# Patient Record
Sex: MALE | Race: WHITE | NOT HISPANIC OR LATINO | URBAN - METROPOLITAN AREA
[De-identification: names, ages, dates, MRNs, and addresses within clinical notes are randomized per-mention and may not be internally consistent; named-entity substitution may affect disease eponyms.]

---

## 2017-03-19 ENCOUNTER — OUTPATIENT (OUTPATIENT)
Dept: OUTPATIENT SERVICES | Facility: HOSPITAL | Age: 52
LOS: 1 days | End: 2017-03-19

## 2017-03-19 ENCOUNTER — INPATIENT (INPATIENT)
Facility: HOSPITAL | Age: 52
LOS: 1 days | Discharge: HOME | End: 2017-03-21
Payer: COMMERCIAL

## 2017-03-19 PROCEDURE — 71020: CPT | Mod: 26

## 2017-03-19 PROCEDURE — 99284 EMERGENCY DEPT VISIT MOD MDM: CPT

## 2017-03-19 PROCEDURE — 71275 CT ANGIOGRAPHY CHEST: CPT | Mod: 26

## 2017-03-20 ENCOUNTER — OUTPATIENT (OUTPATIENT)
Dept: OUTPATIENT SERVICES | Facility: HOSPITAL | Age: 52
LOS: 1 days | End: 2017-03-20

## 2017-03-20 PROCEDURE — 93970 EXTREMITY STUDY: CPT | Mod: 26

## 2017-03-21 ENCOUNTER — OUTPATIENT (OUTPATIENT)
Dept: OUTPATIENT SERVICES | Facility: HOSPITAL | Age: 52
LOS: 1 days | End: 2017-03-21

## 2018-12-23 ENCOUNTER — EMERGENCY (EMERGENCY)
Facility: HOSPITAL | Age: 53
LOS: 1 days | End: 2018-12-23
Payer: COMMERCIAL

## 2018-12-23 PROCEDURE — 71046 X-RAY EXAM CHEST 2 VIEWS: CPT | Mod: 26

## 2018-12-23 PROCEDURE — 99285 EMERGENCY DEPT VISIT HI MDM: CPT

## 2019-02-24 ENCOUNTER — OUTPATIENT (OUTPATIENT)
Dept: OUTPATIENT SERVICES | Facility: HOSPITAL | Age: 54
LOS: 1 days | End: 2019-02-24
Payer: COMMERCIAL

## 2019-02-24 PROCEDURE — 99285 EMERGENCY DEPT VISIT HI MDM: CPT

## 2019-02-24 PROCEDURE — 71045 X-RAY EXAM CHEST 1 VIEW: CPT | Mod: 26

## 2019-02-25 ENCOUNTER — OUTPATIENT (OUTPATIENT)
Dept: OUTPATIENT SERVICES | Facility: HOSPITAL | Age: 54
LOS: 1 days | End: 2019-02-25
Payer: COMMERCIAL

## 2019-02-25 ENCOUNTER — OUTPATIENT (OUTPATIENT)
Dept: OUTPATIENT SERVICES | Facility: HOSPITAL | Age: 54
LOS: 1 days | End: 2019-02-25

## 2019-02-25 PROCEDURE — 93010 ELECTROCARDIOGRAM REPORT: CPT | Mod: 59

## 2019-02-25 PROCEDURE — 93458 L HRT ARTERY/VENTRICLE ANGIO: CPT | Mod: 26

## 2019-02-28 ENCOUNTER — APPOINTMENT (OUTPATIENT)
Dept: CARDIOLOGY | Facility: CLINIC | Age: 54
End: 2019-02-28
Payer: COMMERCIAL

## 2019-02-28 VITALS
OXYGEN SATURATION: 98 % | SYSTOLIC BLOOD PRESSURE: 110 MMHG | HEART RATE: 76 BPM | WEIGHT: 208 LBS | DIASTOLIC BLOOD PRESSURE: 76 MMHG | HEIGHT: 70 IN | BODY MASS INDEX: 29.78 KG/M2

## 2019-02-28 DIAGNOSIS — Z82.49 FAMILY HISTORY OF ISCHEMIC HEART DISEASE AND OTHER DISEASES OF THE CIRCULATORY SYSTEM: ICD-10-CM

## 2019-02-28 DIAGNOSIS — Z78.9 OTHER SPECIFIED HEALTH STATUS: ICD-10-CM

## 2019-02-28 PROCEDURE — 99215 OFFICE O/P EST HI 40 MIN: CPT

## 2019-02-28 RX ORDER — METOPROLOL SUCCINATE 25 MG/1
25 TABLET, EXTENDED RELEASE ORAL
Refills: 0 | Status: DISCONTINUED | COMMUNITY
End: 2019-02-28

## 2019-02-28 NOTE — DISCUSSION/SUMMARY
[FreeTextEntry1] : 1) I discontinued the Beta blocker because of low BP\par 2) He will remain on the BP and Lipid meds\par 3) Return visit in 6 months

## 2019-02-28 NOTE — PHYSICAL EXAM
[General Appearance - Well Developed] : well developed [Normal Appearance] : normal appearance [Well Groomed] : well groomed [General Appearance - Well Nourished] : well nourished [No Deformities] : no deformities [General Appearance - In No Acute Distress] : no acute distress [Normal Conjunctiva] : the conjunctiva exhibited no abnormalities [Eyelids - No Xanthelasma] : the eyelids demonstrated no xanthelasmas [Normal Oral Mucosa] : normal oral mucosa [No Oral Pallor] : no oral pallor [No Oral Cyanosis] : no oral cyanosis [Normal Jugular Venous A Waves Present] : normal jugular venous A waves present [Normal Jugular Venous V Waves Present] : normal jugular venous V waves present [No Jugular Venous Bentley A Waves] : no jugular venous bentley A waves [Respiration, Rhythm And Depth] : normal respiratory rhythm and effort [Exaggerated Use Of Accessory Muscles For Inspiration] : no accessory muscle use [Auscultation Breath Sounds / Voice Sounds] : lungs were clear to auscultation bilaterally [Heart Rate And Rhythm] : heart rate and rhythm were normal [Heart Sounds] : normal S1 and S2 [Murmurs] : no murmurs present [Abdomen Soft] : soft [Abdomen Tenderness] : non-tender [Abdomen Mass (___ Cm)] : no abdominal mass palpated [Abnormal Walk] : normal gait [Gait - Sufficient For Exercise Testing] : the gait was sufficient for exercise testing [Nail Clubbing] : no clubbing of the fingernails [Cyanosis, Localized] : no localized cyanosis [Petechial Hemorrhages (___cm)] : no petechial hemorrhages [Skin Color & Pigmentation] : normal skin color and pigmentation [] : no rash [No Venous Stasis] : no venous stasis [Skin Lesions] : no skin lesions [No Skin Ulcers] : no skin ulcer [No Xanthoma] : no  xanthoma was observed [Oriented To Time, Place, And Person] : oriented to person, place, and time [Affect] : the affect was normal [Mood] : the mood was normal [No Anxiety] : not feeling anxious

## 2019-02-28 NOTE — REASON FOR VISIT
[Follow-Up - From Hospitalization] : follow-up of a recent hospitalization for [Chest Pain] : chest pain [Hyperlipidemia] : hyperlipidemia [Hypertension] : hypertension [FreeTextEntry1] : I saw this 53-year-old man in followup consultation on  02/28/19\par He has a history of treatment of a cerebral AV fistula, possible pulmonary embolus 2 years ago, hypertension hyperlipidemia and a family history for coronary artery disease.\par He was admitted to the hospital with vague chest pain, underwent cardiac cath, which revealed completely normal coronary arteries.\par He is asymptomatic

## 2019-09-03 ENCOUNTER — APPOINTMENT (OUTPATIENT)
Dept: CARDIOLOGY | Facility: CLINIC | Age: 54
End: 2019-09-03

## 2020-11-03 ENCOUNTER — TRANSCRIPTION ENCOUNTER (OUTPATIENT)
Age: 55
End: 2020-11-03

## 2020-11-03 ENCOUNTER — APPOINTMENT (OUTPATIENT)
Dept: ULTRASOUND IMAGING | Facility: CLINIC | Age: 55
End: 2020-11-03
Payer: COMMERCIAL

## 2020-11-03 PROCEDURE — 76700 US EXAM ABDOM COMPLETE: CPT

## 2020-11-03 PROCEDURE — 99072 ADDL SUPL MATRL&STAF TM PHE: CPT

## 2021-01-07 ENCOUNTER — NON-APPOINTMENT (OUTPATIENT)
Age: 56
End: 2021-01-07

## 2021-01-08 ENCOUNTER — NON-APPOINTMENT (OUTPATIENT)
Age: 56
End: 2021-01-08

## 2021-01-08 ENCOUNTER — APPOINTMENT (OUTPATIENT)
Dept: CARDIOLOGY | Facility: CLINIC | Age: 56
End: 2021-01-08
Payer: COMMERCIAL

## 2021-01-08 VITALS
BODY MASS INDEX: 29.49 KG/M2 | TEMPERATURE: 96.8 F | OXYGEN SATURATION: 98 % | DIASTOLIC BLOOD PRESSURE: 68 MMHG | SYSTOLIC BLOOD PRESSURE: 112 MMHG | WEIGHT: 206 LBS | HEIGHT: 70 IN | HEART RATE: 102 BPM

## 2021-01-08 PROCEDURE — 93000 ELECTROCARDIOGRAM COMPLETE: CPT

## 2021-01-08 PROCEDURE — 99214 OFFICE O/P EST MOD 30 MIN: CPT

## 2021-01-08 PROCEDURE — 99072 ADDL SUPL MATRL&STAF TM PHE: CPT

## 2021-01-08 NOTE — PHYSICAL EXAM
[General Appearance - Well Developed] : well developed [Normal Appearance] : normal appearance [Well Groomed] : well groomed [General Appearance - Well Nourished] : well nourished [No Deformities] : no deformities [General Appearance - In No Acute Distress] : no acute distress [Normal Conjunctiva] : the conjunctiva exhibited no abnormalities [Eyelids - No Xanthelasma] : the eyelids demonstrated no xanthelasmas [] : no respiratory distress [Respiration, Rhythm And Depth] : normal respiratory rhythm and effort [Exaggerated Use Of Accessory Muscles For Inspiration] : no accessory muscle use [Auscultation Breath Sounds / Voice Sounds] : lungs were clear to auscultation bilaterally [Heart Rate And Rhythm] : heart rate and rhythm were normal [Heart Sounds] : normal S1 and S2 [Murmurs] : no murmurs present [Abnormal Walk] : normal gait [Gait - Sufficient For Exercise Testing] : the gait was sufficient for exercise testing [Oriented To Time, Place, And Person] : oriented to person, place, and time [Affect] : the affect was normal [Mood] : the mood was normal [FreeTextEntry1] : Anxious

## 2021-01-08 NOTE — ASSESSMENT
[FreeTextEntry1] : To review, Demetrius is a very pleasant 54-year-old male with the following active issues.\par Chest pain: Abnormal EKG.  No prior EKG to compare.  ETT would be nondiagnostic.  Stress echocardiogram for further evaluation.  Low likelihood that pain is obstructive in nature with normal cardiac catheterization in 2019.  Recommend start OTC PPI and follow-up with PMD or GI.  Echocardiogram to rule out any pericardial disease.\par \par Left-sided paresthesias.  Remote.  Resolved.  No further episodes.  Carotid ultrasound to rule out ulcerated or mobile plaque.\par \par Hypertension: Well-controlled on current meds.  Continue same.\par \par Hyperlipidemia: Tolerating statin therapy.  No labs to review.  They will be requested from PMD.\par \par Red flag symptoms that would warrant emergent evaluation discussed.  Pt verbalizes understanding. \par \par F/U to review testing.

## 2021-01-08 NOTE — REVIEW OF SYSTEMS
[Chest Pain] : chest pain [see HPI] : see HPI [Numbness (Hypesthesia)] : numbness [Tingling (Paresthesia)] : tingling [Anxiety] : anxiety [Negative] : Heme/Lymph

## 2021-01-11 ENCOUNTER — APPOINTMENT (OUTPATIENT)
Dept: CARDIOLOGY | Facility: CLINIC | Age: 56
End: 2021-01-11
Payer: COMMERCIAL

## 2021-01-11 PROCEDURE — 93880 EXTRACRANIAL BILAT STUDY: CPT

## 2021-01-11 PROCEDURE — 99072 ADDL SUPL MATRL&STAF TM PHE: CPT

## 2021-01-11 PROCEDURE — 93306 TTE W/DOPPLER COMPLETE: CPT

## 2021-01-13 ENCOUNTER — NON-APPOINTMENT (OUTPATIENT)
Age: 56
End: 2021-01-13

## 2021-02-10 ENCOUNTER — TRANSCRIPTION ENCOUNTER (OUTPATIENT)
Age: 56
End: 2021-02-10

## 2021-02-25 ENCOUNTER — APPOINTMENT (OUTPATIENT)
Dept: CARDIOLOGY | Facility: CLINIC | Age: 56
End: 2021-02-25

## 2021-02-25 ENCOUNTER — RESULT REVIEW (OUTPATIENT)
Age: 56
End: 2021-02-25

## 2021-02-26 ENCOUNTER — APPOINTMENT (OUTPATIENT)
Dept: UROLOGY | Facility: CLINIC | Age: 56
End: 2021-02-26
Payer: COMMERCIAL

## 2021-02-26 ENCOUNTER — TRANSCRIPTION ENCOUNTER (OUTPATIENT)
Age: 56
End: 2021-02-26

## 2021-02-26 VITALS
BODY MASS INDEX: 29.92 KG/M2 | TEMPERATURE: 97.3 F | SYSTOLIC BLOOD PRESSURE: 138 MMHG | HEART RATE: 93 BPM | HEIGHT: 70 IN | DIASTOLIC BLOOD PRESSURE: 96 MMHG | WEIGHT: 209 LBS

## 2021-02-26 PROCEDURE — 99072 ADDL SUPL MATRL&STAF TM PHE: CPT

## 2021-02-26 PROCEDURE — 99205 OFFICE O/P NEW HI 60 MIN: CPT

## 2021-02-26 NOTE — ASSESSMENT
[FreeTextEntry1] : Patient wit 2 episodes of painless hematuria.He is not smoker\par He had a history of nephrolithiasis but today he has no symptoms of renal colic\par His GILLIAN showed normal prostate without any tender lesions.\par I explained to the him our plan: CTU and discuss the Cystoscopy as the next step

## 2021-02-26 NOTE — PHYSICAL EXAM
[General Appearance - Well Developed] : well developed [General Appearance - Well Nourished] : well nourished [Normal Appearance] : normal appearance [Heart Rate And Rhythm] : Heart rate and rhythm were normal [Arterial Pulses Normal] : the pedal pulses were normal [] : no respiratory distress [Lungs Percussion] : the lungs were normal to percussion [Abdomen Soft] : soft [Abdomen Tenderness] : non-tender [Urethral Meatus] : meatus normal [Penis Abnormality] : normal uncircumcised penis [Scrotum] : the scrotum was normal [Prostate Enlargement] : the prostate was not enlarged [Prostate Tenderness] : the prostate was not tender

## 2021-02-26 NOTE — HISTORY OF PRESENT ILLNESS
[FreeTextEntry1] : 55- Patient presented today with the 2 episodes of painless hematuria.\par He has no urgency, frequency or burning/pain\par He is not smoking \par His UA showed Red blood cells without signs of infection\par  [Urinary Incontinence] : no urinary incontinence [Urinary Retention] : no urinary retention [Urinary Urgency] : no urinary urgency [Nocturia] : no nocturia [Straining] : no straining [Hematuria - Gross] : gross hematuria

## 2021-03-01 ENCOUNTER — RESULT REVIEW (OUTPATIENT)
Age: 56
End: 2021-03-01

## 2021-03-01 ENCOUNTER — APPOINTMENT (OUTPATIENT)
Dept: CT IMAGING | Facility: CLINIC | Age: 56
End: 2021-03-01
Payer: COMMERCIAL

## 2021-03-01 LAB
BILIRUB UR QL STRIP: NEGATIVE
CLARITY UR: CLEAR
COLLECTION METHOD: NORMAL
GLUCOSE UR-MCNC: NEGATIVE
HCG UR QL: 0.2 EU/DL
HGB UR QL STRIP.AUTO: NORMAL
KETONES UR-MCNC: NEGATIVE
LEUKOCYTE ESTERASE UR QL STRIP: NEGATIVE
NITRITE UR QL STRIP: NEGATIVE
PH UR STRIP: 6
PROT UR STRIP-MCNC: NORMAL
SP GR UR STRIP: 1.02

## 2021-03-01 PROCEDURE — 74178 CT ABD&PLV WO CNTR FLWD CNTR: CPT

## 2021-03-01 PROCEDURE — Q9967B: CUSTOM

## 2021-03-01 PROCEDURE — 82565A: CUSTOM | Mod: QW

## 2021-03-02 ENCOUNTER — APPOINTMENT (OUTPATIENT)
Dept: CARDIOLOGY | Facility: CLINIC | Age: 56
End: 2021-03-02

## 2021-03-03 ENCOUNTER — TRANSCRIPTION ENCOUNTER (OUTPATIENT)
Age: 56
End: 2021-03-03

## 2021-03-08 ENCOUNTER — APPOINTMENT (OUTPATIENT)
Dept: ULTRASOUND IMAGING | Facility: CLINIC | Age: 56
End: 2021-03-08
Payer: COMMERCIAL

## 2021-03-08 PROCEDURE — 76775 US EXAM ABDO BACK WALL LIM: CPT

## 2021-03-09 ENCOUNTER — NON-APPOINTMENT (OUTPATIENT)
Age: 56
End: 2021-03-09

## 2021-03-11 ENCOUNTER — TRANSCRIPTION ENCOUNTER (OUTPATIENT)
Age: 56
End: 2021-03-11

## 2021-03-12 ENCOUNTER — NON-APPOINTMENT (OUTPATIENT)
Age: 56
End: 2021-03-12

## 2021-03-12 ENCOUNTER — APPOINTMENT (OUTPATIENT)
Dept: UROLOGY | Facility: CLINIC | Age: 56
End: 2021-03-12
Payer: COMMERCIAL

## 2021-03-12 VITALS
SYSTOLIC BLOOD PRESSURE: 128 MMHG | DIASTOLIC BLOOD PRESSURE: 76 MMHG | HEART RATE: 88 BPM | BODY MASS INDEX: 29.92 KG/M2 | WEIGHT: 209 LBS | HEIGHT: 70 IN | TEMPERATURE: 97.3 F

## 2021-03-12 DIAGNOSIS — R31.0 GROSS HEMATURIA: ICD-10-CM

## 2021-03-12 PROCEDURE — 99072 ADDL SUPL MATRL&STAF TM PHE: CPT

## 2021-03-12 PROCEDURE — 99214 OFFICE O/P EST MOD 30 MIN: CPT

## 2021-03-12 NOTE — ASSESSMENT
[FreeTextEntry1] : Patient with his wife to discuss the results of clinical findings and diagnostics\par His CT - U showed a 7 mm stone in the left lower ureter without hydronephrosis and any other suspicions lesions.\par His US denies hydronephrosis but cannot exclude the stone\par Patient has no evidence that he passed the stone\par He had an additional episode of painless gross hematuria.\par We decided not to perform additional CT to decrease the cumulative effect of radiation. We have to do cystoscopy in any way as patient continues with painless hematuria. During this procedure we can do retrograde left ureterography and if the stone is still in the same place we continue with the ureteroscopy and lithotripsy.\par If we see the bloody efflux of urine from the ureter we'll have an additional indication for ureteroscopy \par I inform patient and his wife about the steps of procedure, it's complications and adverse effects

## 2021-03-12 NOTE — PHYSICAL EXAM
[General Appearance - Well Developed] : well developed [General Appearance - Well Nourished] : well nourished [Normal Appearance] : normal appearance [Abdomen Soft] : soft [Abdomen Tenderness] : non-tender [Urethral Meatus] : meatus normal [Penis Abnormality] : normal uncircumcised penis [Scrotum] : the scrotum was normal [Prostate Enlargement] : the prostate was not enlarged [Prostate Tenderness] : the prostate was not tender [Heart Rate And Rhythm] : Heart rate and rhythm were normal [Arterial Pulses Normal] : the pedal pulses were normal [] : no respiratory distress [Lungs Percussion] : the lungs were normal to percussion

## 2021-03-12 NOTE — HISTORY OF PRESENT ILLNESS
[Hematuria - Gross] : gross hematuria [FreeTextEntry1] : 55 year -old patient presented today for the follow-up. I remember him from  our previous visits when he came for the  2 episodes of painless hematuria.\par He had no urgency, frequency or burning/pain\par He is not smoking \par His UA showed Red blood cells without signs of infection\par We did CT-U and found 7 mm stone in the left low ureter without hydronephrosis and any other susp lesions\par We discussed this finding on the phone call\par Today patient presented with his wife to discuss the further steps of treatment and diagnostic procedures\par He had one more episode of painless hematuria\par He already had a follow-up US that could not denie the presence of stone but definitely denies Hydronephrosis  \par  [Urinary Incontinence] : no urinary incontinence [Urinary Retention] : no urinary retention [Urinary Urgency] : no urinary urgency [Nocturia] : no nocturia [Straining] : no straining

## 2021-03-12 NOTE — LETTER BODY
[Consult Letter:] : I had the pleasure of evaluating your patient, [unfilled]. [Consult Closing:] : Thank you very much for allowing me to participate in the care of this patient.  If you have any questions, please do not hesitate to contact me. [FreeTextEntry1] : Patient with his wife to discuss the results of clinical findings and diagnostics His CT - U showed a 7 mm stone in the left lower ureter without hydronephrosis and any other suspicions lesions. His US denies hydronephrosis but cannot exclude the stone Patient has no evidence that he passed the stone He had an additional episode of painless gross hematuria. We decided not to perform additional CT to decrease the cumulative effect of radiation. We have to do cystoscopy in any way as patient continues with painless hematuria. During this procedure we can do retrograde left ureterography and if the stone is still in the same place we continue with the ureteroscopy and lithotripsy. If we see the bloody efflux of urine from the ureter we'll have an additional indication for ureteroscopy  I inform patient and his wife about the steps of procedure, it's complications and adverse effects

## 2021-03-15 ENCOUNTER — APPOINTMENT (OUTPATIENT)
Dept: UROLOGY | Facility: CLINIC | Age: 56
End: 2021-03-15

## 2021-03-18 ENCOUNTER — TRANSCRIPTION ENCOUNTER (OUTPATIENT)
Age: 56
End: 2021-03-18

## 2021-03-19 ENCOUNTER — OUTPATIENT (OUTPATIENT)
Dept: OUTPATIENT SERVICES | Facility: HOSPITAL | Age: 56
LOS: 1 days | End: 2021-03-19

## 2021-03-25 ENCOUNTER — TRANSCRIPTION ENCOUNTER (OUTPATIENT)
Age: 56
End: 2021-03-25

## 2021-03-30 ENCOUNTER — APPOINTMENT (OUTPATIENT)
Dept: DISASTER EMERGENCY | Facility: CLINIC | Age: 56
End: 2021-03-30

## 2021-03-30 DIAGNOSIS — Z01.818 ENCOUNTER FOR OTHER PREPROCEDURAL EXAMINATION: ICD-10-CM

## 2021-03-31 LAB — SARS-COV-2 N GENE NPH QL NAA+PROBE: NOT DETECTED

## 2021-04-02 ENCOUNTER — OUTPATIENT (OUTPATIENT)
Dept: OUTPATIENT SERVICES | Facility: HOSPITAL | Age: 56
LOS: 1 days | End: 2021-04-02
Payer: COMMERCIAL

## 2021-04-02 ENCOUNTER — APPOINTMENT (OUTPATIENT)
Dept: UROLOGY | Facility: HOSPITAL | Age: 56
End: 2021-04-02

## 2021-04-02 PROCEDURE — 52356 CYSTO/URETERO W/LITHOTRIPSY: CPT | Mod: LT

## 2021-04-05 ENCOUNTER — APPOINTMENT (OUTPATIENT)
Dept: UROLOGY | Facility: CLINIC | Age: 56
End: 2021-04-05
Payer: COMMERCIAL

## 2021-04-05 VITALS
DIASTOLIC BLOOD PRESSURE: 74 MMHG | HEIGHT: 70 IN | TEMPERATURE: 97.3 F | HEART RATE: 90 BPM | SYSTOLIC BLOOD PRESSURE: 130 MMHG | BODY MASS INDEX: 29.92 KG/M2 | WEIGHT: 209 LBS

## 2021-04-05 PROCEDURE — 99024 POSTOP FOLLOW-UP VISIT: CPT

## 2021-04-05 NOTE — ASSESSMENT
[FreeTextEntry1] : Today of the first day after the left ureteroscopy with stent insertion and lithotripsy.  Patient had the hematuria after the procedure but now the hematuria stopped.  He denies she has any fever.  He has a stent induced pain that can be described as moderate.  We will plan to take out the stent on the next Monday.  Patient asked me to prescribe the Valium before the procedure and I will do this.

## 2021-04-05 NOTE — HISTORY OF PRESENT ILLNESS
[FreeTextEntry1] : 55-year-old patient presented today for the follow-up.  I operated on him 3 days ago and it was left ureteroscopy.  During this procedure we treated the stone in the left ureter.  We used the laser lithotripsy and the planes and ureter from the fragments.  We also pulled the stent.  Because there was a clear evidence of the impacted stone with the changes of the urothelium around the stone we decided to continue with the stent for 10 days.  The patient presented and I explained to him and his wife our plan.  Patient also has a hematuria after the procedure and I explained that it can be considered is a usual adverse effects after the procedure.  Today his pneumaturia stopped.  He denies chills and fever.

## 2021-04-05 NOTE — PHYSICAL EXAM
[General Appearance - Well Developed] : well developed [General Appearance - Well Nourished] : well nourished [Normal Appearance] : normal appearance [Abdomen Soft] : soft [Abdomen Tenderness] : non-tender [Urethral Meatus] : meatus normal [Penis Abnormality] : normal uncircumcised penis [Scrotum] : the scrotum was normal [Prostate Enlargement] : the prostate was not enlarged [Heart Rate And Rhythm] : Heart rate and rhythm were normal [Prostate Tenderness] : the prostate was not tender [Arterial Pulses Normal] : the pedal pulses were normal [] : no respiratory distress [Lungs Percussion] : the lungs were normal to percussion

## 2021-04-12 ENCOUNTER — APPOINTMENT (OUTPATIENT)
Dept: UROLOGY | Facility: CLINIC | Age: 56
End: 2021-04-12
Payer: COMMERCIAL

## 2021-04-12 VITALS
DIASTOLIC BLOOD PRESSURE: 72 MMHG | BODY MASS INDEX: 29.92 KG/M2 | HEIGHT: 70 IN | HEART RATE: 98 BPM | TEMPERATURE: 97.4 F | SYSTOLIC BLOOD PRESSURE: 109 MMHG | WEIGHT: 209 LBS

## 2021-04-12 PROCEDURE — 99072 ADDL SUPL MATRL&STAF TM PHE: CPT

## 2021-04-12 PROCEDURE — 52005 CYSTO W/URTRL CATHJ: CPT

## 2021-05-13 ENCOUNTER — NON-APPOINTMENT (OUTPATIENT)
Age: 56
End: 2021-05-13

## 2021-05-17 ENCOUNTER — APPOINTMENT (OUTPATIENT)
Dept: UROLOGY | Facility: CLINIC | Age: 56
End: 2021-05-17
Payer: COMMERCIAL

## 2021-05-17 VITALS
SYSTOLIC BLOOD PRESSURE: 122 MMHG | DIASTOLIC BLOOD PRESSURE: 72 MMHG | BODY MASS INDEX: 29.92 KG/M2 | WEIGHT: 209 LBS | HEIGHT: 70 IN | TEMPERATURE: 97.3 F | HEART RATE: 73 BPM

## 2021-05-17 DIAGNOSIS — Z87.448 PERSONAL HISTORY OF OTHER DISEASES OF URINARY SYSTEM: ICD-10-CM

## 2021-05-17 PROCEDURE — 99072 ADDL SUPL MATRL&STAF TM PHE: CPT

## 2021-05-17 PROCEDURE — 99212 OFFICE O/P EST SF 10 MIN: CPT

## 2021-05-17 NOTE — HISTORY OF PRESENT ILLNESS
[FreeTextEntry1] : 55-year-old patient presented today for the follow-up.  I operated on him 1.5 month ago and it was left ureteroscopy.  During this procedure we treated the stone in the left ureter.  We used the laser lithotripsy and the planes and ureter from the fragments.  We also pulled out the stent.  Because there was a clear evidence of the impacted stone with the changes of the urothelium around the stone we decided to continue with the stent for 10 days.  \par Today he feels fine and has no pain and denied gross hematuria\par Last visit I asked him to check urine for salts and citrate and today he presented to discuss the results

## 2021-05-17 NOTE — ASSESSMENT
[FreeTextEntry1] : Patient is after Left Ureteroscopy and stent removal.\par Today we discussed the results of 24 hours collection of urine\par We didn't find any pathology and I recommended him to increase the water consumption. I also asked him to check renal US and we'll consider to return to [facility] in future

## 2021-06-21 ENCOUNTER — EMERGENCY (EMERGENCY)
Facility: HOSPITAL | Age: 56
LOS: 1 days | End: 2021-06-21
Admitting: STUDENT IN AN ORGANIZED HEALTH CARE EDUCATION/TRAINING PROGRAM
Payer: COMMERCIAL

## 2021-06-21 ENCOUNTER — OUTPATIENT (OUTPATIENT)
Dept: OUTPATIENT SERVICES | Facility: HOSPITAL | Age: 56
LOS: 1 days | End: 2021-06-21

## 2021-06-21 ENCOUNTER — NON-APPOINTMENT (OUTPATIENT)
Age: 56
End: 2021-06-21

## 2021-06-21 PROCEDURE — 71045 X-RAY EXAM CHEST 1 VIEW: CPT | Mod: 26

## 2021-06-21 PROCEDURE — 99285 EMERGENCY DEPT VISIT HI MDM: CPT

## 2021-06-21 PROCEDURE — 93010 ELECTROCARDIOGRAM REPORT: CPT

## 2021-06-22 ENCOUNTER — OUTPATIENT (OUTPATIENT)
Dept: OUTPATIENT SERVICES | Facility: HOSPITAL | Age: 56
LOS: 1 days | End: 2021-06-22

## 2021-06-22 PROCEDURE — 93010 ELECTROCARDIOGRAM REPORT: CPT

## 2021-06-22 PROCEDURE — 99244 OFF/OP CNSLTJ NEW/EST MOD 40: CPT

## 2021-06-23 ENCOUNTER — APPOINTMENT (OUTPATIENT)
Dept: CARDIOLOGY | Facility: CLINIC | Age: 56
End: 2021-06-23
Payer: COMMERCIAL

## 2021-06-23 PROCEDURE — 93015 CV STRESS TEST SUPVJ I&R: CPT

## 2021-06-23 PROCEDURE — 99072 ADDL SUPL MATRL&STAF TM PHE: CPT

## 2021-06-28 ENCOUNTER — NON-APPOINTMENT (OUTPATIENT)
Age: 56
End: 2021-06-28

## 2021-06-28 RX ORDER — IRBESARTAN AND HYDROCHLOROTHIAZIDE 300; 12.5 MG/1; MG/1
300-12.5 TABLET ORAL
Refills: 0 | Status: DISCONTINUED | COMMUNITY
End: 2021-06-28

## 2021-06-29 ENCOUNTER — APPOINTMENT (OUTPATIENT)
Dept: CARDIOLOGY | Facility: CLINIC | Age: 56
End: 2021-06-29
Payer: COMMERCIAL

## 2021-06-29 VITALS
HEART RATE: 82 BPM | DIASTOLIC BLOOD PRESSURE: 70 MMHG | OXYGEN SATURATION: 97 % | BODY MASS INDEX: 29.35 KG/M2 | SYSTOLIC BLOOD PRESSURE: 114 MMHG | HEIGHT: 70 IN | TEMPERATURE: 96.9 F | WEIGHT: 205 LBS

## 2021-06-29 DIAGNOSIS — R20.2 ANESTHESIA OF SKIN: ICD-10-CM

## 2021-06-29 DIAGNOSIS — R20.0 ANESTHESIA OF SKIN: ICD-10-CM

## 2021-06-29 DIAGNOSIS — R25.2 CRAMP AND SPASM: ICD-10-CM

## 2021-06-29 PROCEDURE — 99072 ADDL SUPL MATRL&STAF TM PHE: CPT

## 2021-06-29 PROCEDURE — 99214 OFFICE O/P EST MOD 30 MIN: CPT

## 2021-06-29 RX ORDER — CIPROFLOXACIN HYDROCHLORIDE 500 MG/1
500 TABLET, FILM COATED ORAL TWICE DAILY
Qty: 10 | Refills: 0 | Status: DISCONTINUED | COMMUNITY
Start: 2021-04-02 | End: 2021-06-29

## 2021-06-29 RX ORDER — TAMSULOSIN HYDROCHLORIDE 0.4 MG/1
0.4 CAPSULE ORAL
Qty: 30 | Refills: 0 | Status: DISCONTINUED | COMMUNITY
Start: 2021-03-01 | End: 2021-06-29

## 2021-06-29 RX ORDER — ACETAMINOPHEN 500 MG/1
500 TABLET, COATED ORAL
Qty: 30 | Refills: 0 | Status: DISCONTINUED | COMMUNITY
Start: 2021-04-02 | End: 2021-06-29

## 2021-06-29 RX ORDER — PHENAZOPYRIDINE HYDROCHLORIDE 100 MG/1
100 TABLET ORAL 3 TIMES DAILY
Qty: 15 | Refills: 0 | Status: DISCONTINUED | COMMUNITY
Start: 2021-04-02 | End: 2021-06-29

## 2021-06-29 RX ORDER — KETOROLAC TROMETHAMINE 10 MG/1
10 TABLET, FILM COATED ORAL 3 TIMES DAILY
Qty: 15 | Refills: 0 | Status: DISCONTINUED | COMMUNITY
Start: 2021-04-02 | End: 2021-06-29

## 2021-06-29 RX ORDER — DIAZEPAM 10 MG/1
10 TABLET ORAL
Qty: 2 | Refills: 0 | Status: DISCONTINUED | COMMUNITY
Start: 2021-04-05 | End: 2021-06-29

## 2021-07-04 PROBLEM — R25.2 LEG CRAMPING: Status: ACTIVE | Noted: 2021-06-29

## 2021-07-04 PROBLEM — R20.0 NUMBNESS AND TINGLING: Status: ACTIVE | Noted: 2021-01-08

## 2021-07-04 NOTE — ASSESSMENT
[FreeTextEntry1] : To review, Demetrius is a very pleasant 54-year-old male with the following active issues.\par \par Chest pain: Abnormal EKG on recent ETT.  Stress echocardiogram for further evaluation.  Low likelihood that pain is obstructive in nature with normal cardiac catheterization in 2019.  \par \par Left-sided paresthesias.  Remote.  Resolved.  No further episodes.  Normal carotid US. \par \par Hypertension: Well-controlled on current meds.  Continue same.\par \par Hyperlipidemia: Tolerating statin therapy.  Recent increase to atorvastatin 40mg.\par \par Reported Lt leg swelling.  RX for US of LLE given. \par \par Red flag symptoms that would warrant emergent evaluation discussed.  Pt verbalizes understanding. \par \par F/U to review testing.

## 2021-07-04 NOTE — PHYSICAL EXAM
[General Appearance - Well Developed] : well developed jadiel [Normal Appearance] : normal appearance [Well Groomed] : well groomed [General Appearance - Well Nourished] : well nourished [No Deformities] : no deformities [General Appearance - In No Acute Distress] : no acute distress [Normal Conjunctiva] : the conjunctiva exhibited no abnormalities [Eyelids - No Xanthelasma] : the eyelids demonstrated no xanthelasmas [] : no respiratory distress [Respiration, Rhythm And Depth] : normal respiratory rhythm and effort [Exaggerated Use Of Accessory Muscles For Inspiration] : no accessory muscle use [Auscultation Breath Sounds / Voice Sounds] : lungs were clear to auscultation bilaterally [Heart Rate And Rhythm] : heart rate and rhythm were normal [Heart Sounds] : normal S1 and S2 [Murmurs] : no murmurs present [Abnormal Walk] : normal gait [Gait - Sufficient For Exercise Testing] : the gait was sufficient for exercise testing [Oriented To Time, Place, And Person] : oriented to person, place, and time [Affect] : the affect was normal [Mood] : the mood was normal [FreeTextEntry1] : Anxious

## 2021-07-04 NOTE — REASON FOR VISIT
[Follow-Up - Clinic] : a clinic follow-up of [FreeTextEntry1] : Demetrius is a very pleasant 55-year-old male that presents today 6/29/21 for hospital follow up.  \par \par Chest pain: Hx of intermittent chest discomfort.  Seen in the ED for 2 weeks of CP on and off.  Has been working on his deck and felt it was muscle strain.  Was working in the heat and stood up with dizziness and Lt sided chest discomfort.  Denied syncope or near syncope.  Was ruled out for ACS in the ED.  [place] symptoms were 2ndary to dehydration.  HCTZ was discontinued and discharged.  Since this episode, has felt better.  No further symptoms of chest discomfort or dizziness.   Patient had cardiac catheterization in February 2019 with normal coronary arteries.  Continue current meds.  \par \par Hypertension: Well-controlled on current meds.  Continue same.\par \par Hyperlipidemia: Tolerating statin therapy.  Recently increased to 40mg QD.  LDL: 84.\par \par Notes slight swelling in Lt leg.  Denies any pain.  No edema noted on exam.  Negative Homans sign.  This is chronic and stable. Never evaluated.  Hx of PE.  On ASA.  US RX given.  [FreeTextEntry2] : Chest pain

## 2021-07-06 ENCOUNTER — APPOINTMENT (OUTPATIENT)
Dept: ULTRASOUND IMAGING | Facility: CLINIC | Age: 56
End: 2021-07-06
Payer: COMMERCIAL

## 2021-07-06 ENCOUNTER — NON-APPOINTMENT (OUTPATIENT)
Age: 56
End: 2021-07-06

## 2021-07-06 PROCEDURE — 93971 EXTREMITY STUDY: CPT | Mod: LT

## 2021-07-06 RX ORDER — LOSARTAN POTASSIUM 100 MG/1
100 TABLET, FILM COATED ORAL DAILY
Qty: 90 | Refills: 3 | Status: DISCONTINUED | COMMUNITY
Start: 2021-06-28 | End: 2021-07-06

## 2021-07-09 ENCOUNTER — APPOINTMENT (OUTPATIENT)
Dept: CARDIOLOGY | Facility: CLINIC | Age: 56
End: 2021-07-09

## 2021-08-02 ENCOUNTER — APPOINTMENT (OUTPATIENT)
Dept: MRI IMAGING | Facility: CLINIC | Age: 56
End: 2021-08-02

## 2021-08-25 ENCOUNTER — APPOINTMENT (OUTPATIENT)
Dept: CARDIOLOGY | Facility: CLINIC | Age: 56
End: 2021-08-25
Payer: COMMERCIAL

## 2021-08-25 PROCEDURE — 93351 STRESS TTE COMPLETE: CPT

## 2021-08-30 ENCOUNTER — NON-APPOINTMENT (OUTPATIENT)
Age: 56
End: 2021-08-30

## 2021-09-10 ENCOUNTER — APPOINTMENT (OUTPATIENT)
Dept: ULTRASOUND IMAGING | Facility: CLINIC | Age: 56
End: 2021-09-10
Payer: COMMERCIAL

## 2021-09-10 ENCOUNTER — RESULT REVIEW (OUTPATIENT)
Age: 56
End: 2021-09-10

## 2021-09-10 PROCEDURE — 76775 US EXAM ABDO BACK WALL LIM: CPT

## 2021-09-15 ENCOUNTER — APPOINTMENT (OUTPATIENT)
Dept: UROLOGY | Facility: CLINIC | Age: 56
End: 2021-09-15
Payer: COMMERCIAL

## 2021-09-15 VITALS
BODY MASS INDEX: 29.35 KG/M2 | SYSTOLIC BLOOD PRESSURE: 123 MMHG | HEIGHT: 70 IN | HEART RATE: 85 BPM | TEMPERATURE: 98 F | WEIGHT: 205 LBS | DIASTOLIC BLOOD PRESSURE: 84 MMHG

## 2021-09-15 PROCEDURE — 99213 OFFICE O/P EST LOW 20 MIN: CPT

## 2021-09-15 NOTE — ASSESSMENT
[FreeTextEntry1] : Patient presented today to discuss the results of the renal ultrasound.  I personally reviewed the films and agrees that there is no hydronephrosis and no residual stones.\par Because patient had the obvious sign of impacted stone with the changes in the urothelium we decided to continue with the follow-up ultrasound in 6 months.  I pulled the order for this.  Because the patient's stone was the first episode in 55 yes and he is 24 hours collection for the urinary results and citrate was normal we decided not to return to the 24 hours collection of urine.  We will consider this based on the results of the ultrasound that should be done in 6 months.

## 2021-09-15 NOTE — HISTORY OF PRESENT ILLNESS
[FreeTextEntry1] : 55-year-old patient presented today for the follow-up.  I was operated on him 5 months ago and it was left ureteroscopy.  During this procedure we treated the stone in the left ureter.    Because there was a clear evidence of the impacted stone with the changes of the urothelium around the stone we decided to continue with the stent for 10 days.  \par \par Last visit I asked him to check urine for salts and citrate and did not find any pathology. \par We discussed with the patient the possible diet change and to increase the water consumption\par Today patient feels fine and came to discuss the results of renal US

## 2021-11-10 ENCOUNTER — APPOINTMENT (OUTPATIENT)
Dept: UROLOGY | Facility: CLINIC | Age: 56
End: 2021-11-10
Payer: COMMERCIAL

## 2021-11-10 VITALS
HEIGHT: 70 IN | BODY MASS INDEX: 29.49 KG/M2 | WEIGHT: 206 LBS | HEART RATE: 102 BPM | SYSTOLIC BLOOD PRESSURE: 121 MMHG | DIASTOLIC BLOOD PRESSURE: 82 MMHG | TEMPERATURE: 98 F

## 2021-11-10 DIAGNOSIS — N45.1 EPIDIDYMITIS: ICD-10-CM

## 2021-11-10 PROCEDURE — 99214 OFFICE O/P EST MOD 30 MIN: CPT

## 2021-11-10 NOTE — HISTORY OF PRESENT ILLNESS
[Currently Experiencing ___] :  [unfilled] [None] : None [___ Week(s) Ago] : [unfilled] week(s) ago [Constant] : constantly [FreeTextEntry1] : 56 year-old patient presented today with the left scrotal pain. The pain started several days ago and became more and more intensive. Today he can describe his pain as 5/10\par He denies chills and fever and also denies any LUTS [de-identified] : Pressure on lt testicle

## 2021-11-10 NOTE — ASSESSMENT
[FreeTextEntry1] : Pt presented with the pain in the left scrotum and denies LUTS and fever\par His physical exam showed painful lesion in the head of left epididymis\par With suspect to left epididymitis we started treatment with Doxy 100 mg x 2 for 15 days and Ibuprofen 600 mg x 2

## 2021-11-24 ENCOUNTER — APPOINTMENT (OUTPATIENT)
Dept: UROLOGY | Facility: CLINIC | Age: 56
End: 2021-11-24
Payer: COMMERCIAL

## 2021-11-24 VITALS
HEIGHT: 70 IN | HEART RATE: 93 BPM | TEMPERATURE: 96.9 F | BODY MASS INDEX: 29.49 KG/M2 | SYSTOLIC BLOOD PRESSURE: 128 MMHG | DIASTOLIC BLOOD PRESSURE: 74 MMHG | WEIGHT: 206 LBS | OXYGEN SATURATION: 97 %

## 2021-11-24 PROCEDURE — 99214 OFFICE O/P EST MOD 30 MIN: CPT

## 2021-11-24 NOTE — ASSESSMENT
[FreeTextEntry1] : Patient reported partial improvement after treatment with AB and NSAIDs. WE decided to continue with NSAIDs and Gabapentin 100 mg x 3\par WE also asked him to do Testicular/scrotal US\par I'll see patient in 2-3 weeks\par

## 2021-11-24 NOTE — HISTORY OF PRESENT ILLNESS
[FreeTextEntry1] : 56 year-old patient presented for the follow-up. Last visit I saw him for the left scrotal pain.\par On physical exam we found painful firm lesion in the head of the left epididymis. \par WE started with AB and NSAIDs\par Today patient presented and reported that his pain in the left scrotum decreased and today his pain could be described as  4-5/10\par He denies dysuria, urethral discharge and fever

## 2021-11-24 NOTE — PHYSICAL EXAM
[General Appearance - Well Developed] : well developed [General Appearance - Well Nourished] : well nourished [Normal Appearance] : normal appearance [Abdomen Soft] : soft [Abdomen Tenderness] : non-tender [Urethral Meatus] : meatus normal [Penis Abnormality] : normal uncircumcised penis [Prostate Enlargement] : the prostate was not enlarged [Prostate Tenderness] : the prostate was not tender [FreeTextEntry1] : the lesion in the head of left epididymis is less firm and less tender [Heart Rate And Rhythm] : Heart rate and rhythm were normal [Arterial Pulses Normal] : the pedal pulses were normal [] : no respiratory distress [Lungs Percussion] : the lungs were normal to percussion

## 2021-11-26 ENCOUNTER — APPOINTMENT (OUTPATIENT)
Dept: ULTRASOUND IMAGING | Facility: CLINIC | Age: 56
End: 2021-11-26
Payer: COMMERCIAL

## 2021-11-26 PROCEDURE — 76870 US EXAM SCROTUM: CPT

## 2021-12-08 ENCOUNTER — APPOINTMENT (OUTPATIENT)
Dept: UROLOGY | Facility: CLINIC | Age: 56
End: 2021-12-08
Payer: COMMERCIAL

## 2021-12-08 VITALS
SYSTOLIC BLOOD PRESSURE: 133 MMHG | BODY MASS INDEX: 29.49 KG/M2 | DIASTOLIC BLOOD PRESSURE: 81 MMHG | HEIGHT: 70 IN | HEART RATE: 88 BPM | WEIGHT: 206 LBS | TEMPERATURE: 97.6 F

## 2021-12-08 DIAGNOSIS — N43.40 SPERMATOCELE OF EPIDIDYMIS, UNSPECIFIED: ICD-10-CM

## 2021-12-08 PROCEDURE — 99213 OFFICE O/P EST LOW 20 MIN: CPT

## 2021-12-08 NOTE — HISTORY OF PRESENT ILLNESS
[FreeTextEntry1] : 56 year-old patient presented for the follow-up. Last visit I saw him for the left scrotal pain.\par On physical exam we found painful firm lesion in the head of the left epididymis. \par WE started with AB and NSAIDs. he felt better and last visit we asked him to do US of testicles and [id number] only with NSAIDs \par Today patient presented and reported that his pain in the left scrotum decreased and today his pain could be described as 2-3/10\par He denies dysuria, urethral discharge and fever

## 2021-12-08 NOTE — ASSESSMENT
[FreeTextEntry1] : Patient feels much better and can continue with NSAIDs\par His scrotal pain today can be described as 2-3 /10\par I personally reviewed patient scrotal US and agreed that there are no pathological findings in the testicles but disagreed that there lesion in the left epididymal head can be appendix testicle. I see this lesion as a small spermatocele as it has the fluid content

## 2021-12-08 NOTE — PHYSICAL EXAM
[General Appearance - Well Developed] : well developed [General Appearance - Well Nourished] : well nourished [Normal Appearance] : normal appearance [Abdomen Soft] : soft [Abdomen Tenderness] : non-tender [Urethral Meatus] : meatus normal [Penis Abnormality] : normal uncircumcised penis [Prostate Enlargement] : the prostate was not enlarged [Prostate Tenderness] : the prostate was not tender [FreeTextEntry1] : the lesion in the head of left epididymis is less firm and less tender. It looks like small left spermatocele [Heart Rate And Rhythm] : Heart rate and rhythm were normal [Arterial Pulses Normal] : the pedal pulses were normal [] : no respiratory distress [Lungs Percussion] : the lungs were normal to percussion

## 2021-12-14 ENCOUNTER — APPOINTMENT (OUTPATIENT)
Dept: CARDIOLOGY | Facility: CLINIC | Age: 56
End: 2021-12-14
Payer: COMMERCIAL

## 2021-12-14 ENCOUNTER — NON-APPOINTMENT (OUTPATIENT)
Age: 56
End: 2021-12-14

## 2021-12-14 VITALS
HEART RATE: 84 BPM | OXYGEN SATURATION: 98 % | BODY MASS INDEX: 30.49 KG/M2 | DIASTOLIC BLOOD PRESSURE: 80 MMHG | SYSTOLIC BLOOD PRESSURE: 134 MMHG | WEIGHT: 213 LBS | TEMPERATURE: 97.3 F | HEIGHT: 70 IN

## 2021-12-14 DIAGNOSIS — Z00.00 ENCOUNTER FOR GENERAL ADULT MEDICAL EXAMINATION W/OUT ABNORMAL FINDINGS: ICD-10-CM

## 2021-12-14 PROCEDURE — 93000 ELECTROCARDIOGRAM COMPLETE: CPT

## 2021-12-14 PROCEDURE — 99215 OFFICE O/P EST HI 40 MIN: CPT

## 2021-12-14 RX ORDER — GABAPENTIN 100 MG/1
100 CAPSULE ORAL 3 TIMES DAILY
Qty: 90 | Refills: 1 | Status: DISCONTINUED | COMMUNITY
Start: 2021-11-24 | End: 2021-12-14

## 2021-12-14 RX ORDER — IBUPROFEN 600 MG/1
600 TABLET, FILM COATED ORAL TWICE DAILY
Qty: 20 | Refills: 0 | Status: DISCONTINUED | COMMUNITY
Start: 2021-11-10 | End: 2021-12-14

## 2021-12-14 RX ORDER — IBUPROFEN 600 MG/1
600 TABLET, FILM COATED ORAL
Qty: 14 | Refills: 1 | Status: DISCONTINUED | COMMUNITY
Start: 2021-11-24 | End: 2021-12-14

## 2021-12-14 RX ORDER — DOXYCYCLINE 100 MG/1
100 CAPSULE ORAL TWICE DAILY
Qty: 30 | Refills: 0 | Status: DISCONTINUED | COMMUNITY
Start: 2021-11-10 | End: 2021-12-14

## 2021-12-14 NOTE — DISCUSSION/SUMMARY
[FreeTextEntry1] : 1) I discontinued the Beta blocker because of low BP\par 2) He will remain on the BP and Lipid meds\par 3) Return visit in 12 months

## 2021-12-14 NOTE — REASON FOR VISIT
[Follow-Up - From Hospitalization] : follow-up of a recent hospitalization for [Chest Pain] : chest pain [Hyperlipidemia] : hyperlipidemia [Hypertension] : hypertension [FreeTextEntry3] : Dr. Blunt [FreeTextEntry1] : I saw this 56-year-old man in followup consultation on  12/14/21\par He has a history of treatment of a cerebral AV fistula, possible pulmonary embolus hypertension hyperlipidemia and a family history for coronary artery disease.\par He was admitted to the hospital with vague chest pain, underwent cardiac cath (2019) , which revealed completely normal coronary arteries.\par He is asymptomatic

## 2022-03-09 ENCOUNTER — APPOINTMENT (OUTPATIENT)
Dept: ULTRASOUND IMAGING | Facility: CLINIC | Age: 57
End: 2022-03-09
Payer: COMMERCIAL

## 2022-03-09 PROCEDURE — 76775 US EXAM ABDO BACK WALL LIM: CPT

## 2022-03-21 ENCOUNTER — APPOINTMENT (OUTPATIENT)
Dept: UROLOGY | Facility: CLINIC | Age: 57
End: 2022-03-21
Payer: COMMERCIAL

## 2022-03-21 VITALS
WEIGHT: 210 LBS | DIASTOLIC BLOOD PRESSURE: 88 MMHG | TEMPERATURE: 97.1 F | HEART RATE: 82 BPM | BODY MASS INDEX: 30.06 KG/M2 | SYSTOLIC BLOOD PRESSURE: 137 MMHG | HEIGHT: 70 IN

## 2022-03-21 DIAGNOSIS — N50.82 SCROTAL PAIN: ICD-10-CM

## 2022-03-21 PROCEDURE — 99213 OFFICE O/P EST LOW 20 MIN: CPT

## 2022-03-21 NOTE — HISTORY OF PRESENT ILLNESS
[FreeTextEntry1] : 56 year-old patient presented for the follow-up.\par His scrotal pain can be described as uncomfortable feelings. It take place once in a while.\par His US - showed no renal stones

## 2022-03-21 NOTE — ASSESSMENT
[FreeTextEntry1] : Feels better\par we discussed that there is no need for any intervention for the left scrotal pain\par He will use NSAIDs only if he needs\par His US showed no renal stones\par We asked him to return for [facility]\par

## 2022-04-22 NOTE — REASON FOR VISIT
Recent Review Flowsheet Data     Date 4/22/2022    Adult PHQ 2 Score 6    Adult PHQ 2 Interpretation Further screening needed    Little interest or pleasure in activity? Nearly every day    Feeling down, depressed or hopeless? Nearly every day    Adult PHQ 9 Score 25    Adult PHQ 9 Interpretation Severe Depression    Trouble falling or staying asleep or sleeping all the time? Nearly every day    Feeling tired or having little energy? Nearly every day    Poor appetite or overeating? More than half the days    Feeling bad about yourself or that you are a failure or have let yourself or family down? Nearly every day    Trouble concentrating on things such as reading the newspaper or watching TV? More than half the days    Moving or speaking slowly that other people have noticed or the opposite - being so fidgety or restless that you have been moving around a lot more than usual? Nearly every day    Thoughts that you would be better off dead or of hurting yourself in some way? Nearly every day    If you reported any problems, how difficult have these problems made it to do your work, take care of things at home, or get along with other people? Extremely difficult        Last four GAD7 Assessments       GAD7 4/22/2022 3/21/2022   GAD7 Score 19 16   Feeling nervous, anxious or on edge Nearly every day More than half the days   Not being able to stop or control worrying Nearly every day More than half the days   Worrying too much about different things Nearly every day More than half the days   Trouble relaxing Nearly every day More than half the days   Being so restless that it's hard to sit still Several days More than half the days   Becoming easily annoyed or irritable Nearly every day Nearly every day   Feeling afraid as if something awful might happen Nearly every day Nearly every day   Ability to handle work, home and other people Extremely difficult Extremely difficult   [place] Suicide Severity Rating Scale  1.  Have you wished you were dead or wished you could go to sleep and not wake up? (past month): Yes (04/22/22 0826)  2. Have you actually had any thoughts of killing yourself? (past month): Yes (04/22/22 0826)  3. Have you been thinking about how you might kill yourself? (past month): Yes (04/22/22 0826)  4. Have you had these thoughts and had some intention of acting on them? (past month): No (04/22/22 0826)  5. Have you started to work out or worked out the details of how to kill yourself? Do you intend to carry out this plan? (past month): No (04/22/22 0826)  6. Have you ever done anything, started to do anything, or prepared to do anything to end your life? (lifetime): No (04/22/22 0826)  Suicide Evaluation: Moderate Risk - [place] (04/22/22 0826)   [Follow-Up - Clinic] : a clinic follow-up of [FreeTextEntry2] : Chest pain [FreeTextEntry1] : Demetrius is a very pleasant 55-year-old male that presents today with complaints of chest pain.\par \par Chest pain: States he was sitting at his desk and developed left-sided chest discomfort that spread across his chest.  Described as a burning sensation.  Lasted throughout the day.  Has continued sporadically.  No other symptoms.\par EKG done in the office today for chest pain was ordered and reviewed by me.  Sinus tachycardia with T wave inversions inferolaterally.  There is no prior EKG to compare to.\par Patient had cardiac catheterization in February 2019 with normal coronary arteries.\par \par Hypertension: Well-controlled on current meds.  Continue same.\par \par Hyperlipidemia: Tolerating statin therapy.  No recent lab work to review.  He follows with his PMD.\par \par Makes note of one episode of numbness and tingling in his left elbow and left lower leg.  Happened several days ago.  No recurrence.  Denies any other FND.

## 2022-05-03 ENCOUNTER — APPOINTMENT (OUTPATIENT)
Dept: UROLOGY | Facility: CLINIC | Age: 57
End: 2022-05-03
Payer: COMMERCIAL

## 2022-05-03 VITALS
WEIGHT: 210 LBS | HEART RATE: 86 BPM | DIASTOLIC BLOOD PRESSURE: 77 MMHG | TEMPERATURE: 97.2 F | BODY MASS INDEX: 30.06 KG/M2 | SYSTOLIC BLOOD PRESSURE: 127 MMHG | HEIGHT: 70 IN

## 2022-05-03 PROCEDURE — 99214 OFFICE O/P EST MOD 30 MIN: CPT

## 2022-05-03 NOTE — HISTORY OF PRESENT ILLNESS
[FreeTextEntry1] : 56 year-old patient presented to discuss the results of recent Litholink and also he complained on postvoiding dripping. \par His Erections are  not regular \par he denies chills and fever\par He has a mild LUTS with IPSS - 9 - urgency and frequency

## 2022-05-03 NOTE — PHYSICAL EXAM
[General Appearance - Well Developed] : well developed [General Appearance - Well Nourished] : well nourished [Normal Appearance] : normal appearance [Abdomen Soft] : soft [Abdomen Tenderness] : non-tender [Urethral Meatus] : meatus normal [Penis Abnormality] : normal uncircumcised penis [Prostate Enlargement] : the prostate was not enlarged [Prostate Tenderness] : the prostate was not tender [FreeTextEntry1] : the lesion in the head of left epididymis is less firm and less tender. GILLIAN - enlarged right SV  [Heart Rate And Rhythm] : Heart rate and rhythm were normal [Arterial Pulses Normal] : the pedal pulses were normal [] : no respiratory distress [Lungs Percussion] : the lungs were normal to percussion

## 2022-05-03 NOTE — ASSESSMENT
[FreeTextEntry1] : We decided to stop Potassium citrate and continue with diet and high water consumption. \par WE decided to start with Cialis 5 mg every other day and see him in 4 weeks

## 2022-05-11 ENCOUNTER — EMERGENCY (EMERGENCY)
Facility: HOSPITAL | Age: 57
LOS: 1 days | Discharge: HOME | End: 2022-05-11
Admitting: EMERGENCY MEDICINE
Payer: COMMERCIAL

## 2022-05-11 DIAGNOSIS — R42 DIZZINESS AND GIDDINESS: ICD-10-CM

## 2022-05-11 DIAGNOSIS — H73.893 OTHER SPECIFIED DISORDERS OF TYMPANIC MEMBRANE, BILATERAL: ICD-10-CM

## 2022-05-11 PROCEDURE — 99284 EMERGENCY DEPT VISIT MOD MDM: CPT

## 2022-05-11 PROCEDURE — 93010 ELECTROCARDIOGRAM REPORT: CPT

## 2022-05-16 ENCOUNTER — NON-APPOINTMENT (OUTPATIENT)
Age: 57
End: 2022-05-16

## 2022-05-16 ENCOUNTER — APPOINTMENT (OUTPATIENT)
Dept: CARDIOLOGY | Facility: CLINIC | Age: 57
End: 2022-05-16
Payer: COMMERCIAL

## 2022-05-16 VITALS
TEMPERATURE: 98 F | HEART RATE: 103 BPM | SYSTOLIC BLOOD PRESSURE: 130 MMHG | DIASTOLIC BLOOD PRESSURE: 82 MMHG | OXYGEN SATURATION: 99 % | RESPIRATION RATE: 12 BRPM | HEIGHT: 70 IN

## 2022-05-16 DIAGNOSIS — Z96.0 PRESENCE OF UROGENITAL IMPLANTS: ICD-10-CM

## 2022-05-16 PROCEDURE — 99214 OFFICE O/P EST MOD 30 MIN: CPT

## 2022-05-16 NOTE — DISCUSSION/SUMMARY
[FreeTextEntry1] : Labile hypertension.  I have asked the patient to keep a blood pressure log.  At one time he was on beta-blocker which was discontinued due to low blood pressures.  He takes Avapro 300 mg daily.\par \par Hyperlipidemia.  On Lipitor and aspirin.  Follow-up fasting lipid profile with primary care\par \par Echocardiogram for evaluation of hypertensive changes.  If present, it probably suggests uncontrolled hypertension and will require augmentation of blood pressure regimen\par \par History of minimal coronary atherosclerosis on cardiac cath in 2019.  Aggressive primary prevention was discussed in detail including lifestyle changes.\par \par Thank you for this referral and allowing me to participate in the care of this patient.  If I can be of any further help or  if you have any questions, please do not hesitate to contact me\par \par \par Sincerely,\par \par Phillip Back MD, FACC, CHANDRAKANT

## 2022-05-16 NOTE — REASON FOR VISIT
[FreeTextEntry3] : Dr. Blunt [FreeTextEntry1] : Demetrius is a 56-year-old male with hypertension and hyperlipidemia.  He has History of treatment of a cerebral AV fistula, possible pulmonary embolus, hypertension, hyperlipidemia and a family history for coronary artery disease.\par \par 2019, he was admitted to the hospital with vague chest pain, underwent cardiac cath (2019) , which revealed completely normal coronary arteries.\par \par The patient had some "whooshing" sound in the ears for which she went to the ER.  Fluid in the ear was detected.  He was given decongestant oral pills and is now feeling well.  No vertigo, TIA, lightheadedness or syncope.\par \par In the urgent care, he was told that his EKG was not normal.  The EKG on 5/11/2022 upon my review and as per Dr. Andrade is unremarkable.  He has history of hypertension and labile blood pressure and episodes of  elevated blood pressure with stress.  He is compliant with his medications that include aspirin, Lipitor, Avapro\par \par  he denies chest pain, shortness of breath or palpitations.\par \par \par  [Follow-Up - From Hospitalization] : follow-up of a recent hospitalization for [Chest Pain] : chest pain [Hyperlipidemia] : hyperlipidemia [Hypertension] : hypertension

## 2022-06-01 ENCOUNTER — APPOINTMENT (OUTPATIENT)
Dept: UROLOGY | Facility: CLINIC | Age: 57
End: 2022-06-01
Payer: COMMERCIAL

## 2022-06-01 VITALS
HEIGHT: 70 IN | DIASTOLIC BLOOD PRESSURE: 90 MMHG | HEART RATE: 82 BPM | BODY MASS INDEX: 30.06 KG/M2 | TEMPERATURE: 97.3 F | SYSTOLIC BLOOD PRESSURE: 135 MMHG | WEIGHT: 210 LBS

## 2022-06-01 DIAGNOSIS — N52.9 MALE ERECTILE DYSFUNCTION, UNSPECIFIED: ICD-10-CM

## 2022-06-01 DIAGNOSIS — R30.0 DYSURIA: ICD-10-CM

## 2022-06-01 DIAGNOSIS — N20.1 CALCULUS OF URETER: ICD-10-CM

## 2022-06-01 PROCEDURE — 99213 OFFICE O/P EST LOW 20 MIN: CPT

## 2022-06-01 NOTE — PHYSICAL EXAM
[General Appearance - Well Developed] : well developed [General Appearance - Well Nourished] : well nourished [Normal Appearance] : normal appearance [Abdomen Soft] : soft [Abdomen Tenderness] : non-tender [Urethral Meatus] : meatus normal [Penis Abnormality] : normal uncircumcised penis [Heart Rate And Rhythm] : Heart rate and rhythm were normal [Arterial Pulses Normal] : the pedal pulses were normal [] : no respiratory distress [Lungs Percussion] : the lungs were normal to percussion

## 2022-06-01 NOTE — ASSESSMENT
[FreeTextEntry1] : We stopped Potassium citrate and continue with diet and high water consumption. \par WE continue with Cialis 5 mg every other day

## 2022-06-01 NOTE — HISTORY OF PRESENT ILLNESS
[FreeTextEntry1] : 56 year-old patient presented for the follow-up\par Last visit we started treatment with Cialis 5 mg because of ED and mild LUTS with IPSS - 9 - urgency and frequency \par Today he reported significant improvement

## 2022-06-03 ENCOUNTER — APPOINTMENT (OUTPATIENT)
Dept: CARDIOLOGY | Facility: CLINIC | Age: 57
End: 2022-06-03
Payer: COMMERCIAL

## 2022-06-03 VITALS
RESPIRATION RATE: 12 BRPM | BODY MASS INDEX: 30.06 KG/M2 | WEIGHT: 210 LBS | OXYGEN SATURATION: 98 % | SYSTOLIC BLOOD PRESSURE: 116 MMHG | HEIGHT: 70 IN | TEMPERATURE: 97.5 F | DIASTOLIC BLOOD PRESSURE: 72 MMHG | HEART RATE: 85 BPM

## 2022-06-03 PROCEDURE — 93306 TTE W/DOPPLER COMPLETE: CPT

## 2022-06-03 PROCEDURE — 99213 OFFICE O/P EST LOW 20 MIN: CPT

## 2022-06-03 NOTE — PHYSICAL EXAM
[Normal] : normal gait [No Edema] : no edema [No Rash] : no rash [Moves all extremities] : moves all extremities [Alert and Oriented] : alert and oriented

## 2022-06-05 NOTE — REASON FOR VISIT
[Chest Pain] : chest pain [FreeTextEntry3] : Dr. Blunt [FreeTextEntry1] : Demetrius is a 56-year-old male with hypertension and hyperlipidemia.  He has History of treatment of a cerebral AV fistula, possible pulmonary embolus, hypertension, hyperlipidemia and a family history for coronary artery disease.\par \par Last visit was 5/16/2022. Today he presents for a follow up and to review his Echo. In the interim there have not been any hospitalizations or procedures. He denies chest pain, pressure, palpitations, unusual shortness of breath, ANDRES, orthopnea, LE edema, lightheadedness, dizziness, near syncope or syncope. \par \par  He has history of hypertension and labile blood pressure and episodes of  elevated blood pressure with stress.  He is compliant with his medications that include aspirin, Lipitor, Avapro\par \par \par \par \par

## 2022-06-05 NOTE — DISCUSSION/SUMMARY
[FreeTextEntry1] : RUSSEL PARMAR is a 56 year old M who presents today with the following active issues: \par \par Labile hypertension.  Stable today\par I have asked the patient to keep a blood pressure log.  At one time he was on beta-blocker which was discontinued due to low blood pressures.  He takes Avapro 300 mg daily.\par - Advised patient when convenient for him we would place a BP Monitor on him.\par -Echo today noted above but dos not reveal any HTN changes. \par \par Hyperlipidemia.  On Lipitor and aspirin.  Follow-up fasting lipid profile with primary care\par \par History of minimal coronary atherosclerosis on cardiac cath in 2019.  Aggressive primary prevention was discussed in detail including lifestyle changes\par \par Follow up 1 year\par May call office and have BP Monitoring at home when he is able to\par Sincerely,\par \par Abiola Stout ANP-C\par Patients history, testing, and plan reviewed with supervising MD: Dr. Kevan Arzola

## 2022-06-05 NOTE — CARDIOLOGY SUMMARY
[de-identified] : 6/3/2022: EF 60% Normal LV systolic function and no seg. wall motion abnormalities. Normal PASP.  [de-identified] : 2019: normal Coronaries. minimal coronary atherosclerosis

## 2022-08-03 ENCOUNTER — APPOINTMENT (OUTPATIENT)
Dept: ULTRASOUND IMAGING | Facility: CLINIC | Age: 57
End: 2022-08-03

## 2022-08-03 PROCEDURE — 76705 ECHO EXAM OF ABDOMEN: CPT

## 2022-09-30 ENCOUNTER — NON-APPOINTMENT (OUTPATIENT)
Age: 57
End: 2022-09-30

## 2022-10-04 ENCOUNTER — APPOINTMENT (OUTPATIENT)
Dept: UROLOGY | Facility: CLINIC | Age: 57
End: 2022-10-04

## 2022-10-04 VITALS
DIASTOLIC BLOOD PRESSURE: 76 MMHG | BODY MASS INDEX: 30.35 KG/M2 | TEMPERATURE: 96.8 F | SYSTOLIC BLOOD PRESSURE: 109 MMHG | OXYGEN SATURATION: 98 % | WEIGHT: 212 LBS | HEART RATE: 81 BPM | HEIGHT: 70 IN

## 2022-10-04 DIAGNOSIS — N28.1 CYST OF KIDNEY, ACQUIRED: ICD-10-CM

## 2022-10-04 DIAGNOSIS — N32.0 BLADDER-NECK OBSTRUCTION: ICD-10-CM

## 2022-10-04 DIAGNOSIS — Z87.442 PERSONAL HISTORY OF URINARY CALCULI: ICD-10-CM

## 2022-10-04 PROCEDURE — 99214 OFFICE O/P EST MOD 30 MIN: CPT

## 2022-10-04 NOTE — ASSESSMENT
[FreeTextEntry1] : I personally reviewed patient's CT that was done recently and compared it to the results of previous CT and US\par Patient has the same very small stones in the right kidney - one 1.5 and one 2.2 mm in the greatest diameters. The  same stones with the same size were seen last year. Very small stones (two stones each of 1 mm) in the left kidney\par There are the same size simple cysts in the right kidney and tiny cyst in the left. All cysts are simple and don't need any additional assessment \par We decided that there is no need for any procedure and treatment now.

## 2022-10-04 NOTE — HISTORY OF PRESENT ILLNESS
[FreeTextEntry1] : 56 year-old patient presented for the follow-up\par He is on Cialis 5 mg every other day and feels well\par He had CT done because of abdominal pain and wants to discuss with me the results \par He has no lumbar pains

## 2022-12-19 ENCOUNTER — OFFICE (OUTPATIENT)
Dept: URBAN - METROPOLITAN AREA CLINIC 8 | Facility: CLINIC | Age: 57
Setting detail: OPHTHALMOLOGY
End: 2022-12-19
Payer: COMMERCIAL

## 2022-12-19 ENCOUNTER — RX ONLY (RX ONLY)
Age: 57
End: 2022-12-19

## 2022-12-19 DIAGNOSIS — H15.102: ICD-10-CM

## 2022-12-19 PROCEDURE — 99213 OFFICE O/P EST LOW 20 MIN: CPT | Performed by: OPHTHALMOLOGY

## 2022-12-19 ASSESSMENT — REFRACTION_MANIFEST
OD_SPHERE: -0.75
OS_VA1: 20/20-1
OD_ADD: +2.25
OD_AXIS: 105
OS_ADD: +2.25
OS_SPHERE: -0.25
OS_AXIS: 075
OS_CYLINDER: -2.25
OD_VA2: 20/20
OD_VA1: 20/20
OS_VA2: 20/20
OU_VA: 20/20
OD_CYLINDER: -1.25

## 2022-12-19 ASSESSMENT — REFRACTION_CURRENTRX
OS_OVR_VA: 20/
OD_VPRISM_DIRECTION: SV
OD_CYLINDER: -1.25
OD_OVR_VA: 20/
OS_VPRISM_DIRECTION: SV
OS_AXIS: 074
OS_CYLINDER: -2.25
OD_SPHERE: -0.25
OD_AXIS: 098
OS_SPHERE: PLANO

## 2022-12-19 ASSESSMENT — TONOMETRY
OS_IOP_MMHG: 15
OD_IOP_MMHG: 14

## 2022-12-19 ASSESSMENT — VISUAL ACUITY
OD_BCVA: 20/20-1
OS_BCVA: 20/20+2

## 2022-12-19 ASSESSMENT — AXIALLENGTH_DERIVED
OS_AL: 24.6807
OD_AL: 24.423
OD_AL: 24.5806
OS_AL: 24.6275

## 2022-12-19 ASSESSMENT — SPHEQUIV_DERIVED
OD_SPHEQUIV: -1
OD_SPHEQUIV: -1.375
OS_SPHEQUIV: -1.25
OS_SPHEQUIV: -1.375

## 2022-12-19 ASSESSMENT — CONFRONTATIONAL VISUAL FIELD TEST (CVF)
OD_FINDINGS: FULL
OS_FINDINGS: FULL

## 2022-12-19 ASSESSMENT — KERATOMETRY
OD_K1POWER_DIOPTERS: 42.25
OD_K2POWER_DIOPTERS: 42.50
OD_AXISANGLE_DEGREES: 024
OS_K2POWER_DIOPTERS: 43.00
OS_AXISANGLE_DEGREES: 167
OS_K1POWER_DIOPTERS: 41.25

## 2022-12-19 ASSESSMENT — REFRACTION_AUTOREFRACTION
OD_CYLINDER: +1.00
OS_AXIS: 168
OS_SPHERE: -2.75
OD_AXIS: 016
OS_CYLINDER: +3.00
OD_SPHERE: -1.50

## 2023-01-09 ENCOUNTER — APPOINTMENT (OUTPATIENT)
Dept: CARDIOLOGY | Facility: CLINIC | Age: 58
End: 2023-01-09
Payer: COMMERCIAL

## 2023-01-09 ENCOUNTER — NON-APPOINTMENT (OUTPATIENT)
Age: 58
End: 2023-01-09

## 2023-01-09 VITALS
HEIGHT: 70 IN | BODY MASS INDEX: 30.35 KG/M2 | OXYGEN SATURATION: 97 % | TEMPERATURE: 98.2 F | DIASTOLIC BLOOD PRESSURE: 76 MMHG | SYSTOLIC BLOOD PRESSURE: 130 MMHG | WEIGHT: 212 LBS | HEART RATE: 102 BPM

## 2023-01-09 PROCEDURE — 99214 OFFICE O/P EST MOD 30 MIN: CPT | Mod: 25

## 2023-01-09 PROCEDURE — 93000 ELECTROCARDIOGRAM COMPLETE: CPT

## 2023-01-09 RX ORDER — FLUTICASONE PROPIONATE 50 UG/1
50 SPRAY, METERED NASAL DAILY
Refills: 0 | Status: ACTIVE | COMMUNITY

## 2023-01-09 NOTE — CARDIOLOGY SUMMARY
[de-identified] : 6/3/2022: EF 60% Normal LV systolic function and no seg. wall motion abnormalities. Normal PASP.  [de-identified] : 2019: normal Coronaries. minimal coronary atherosclerosis  [de-identified] : 1/2021 no sig carotid disease   [de-identified] : 6/2021 LABWORK: ldl 84. cbc.cmp. trig 151.

## 2023-01-09 NOTE — DISCUSSION/SUMMARY
[FreeTextEntry1] : \par # Labile hypertension. Stable today. \par I have asked the patient to keep a blood pressure log.  At one time he was on beta-blocker which was discontinued due to low blood pressures.  He takes Avapro 300 mg daily.\par - Advised patient when convenient for him we would place a BP Monitor on him.\par - Echo noted above but does not reveal any HTN changes. \par \par # Hyperlipidemia.  On Lipitor and aspirin.  Follow-up fasting lipid profile with primary care\par \par # History of minimal coronary atherosclerosis on cardiac cath in 2019.  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 appears unremarkable. pending to see ENT and have referred to neurology\par \par call with carotid results. Follow up 1 year. May call office and have BP Monitoring at home when he is able to.\par

## 2023-01-09 NOTE — REASON FOR VISIT
[Chest Pain] : chest pain [Symptom and Test Evaluation] : symptom and test evaluation [CV Risk Factors and Non-Cardiac Disease] : CV risk factors and non-cardiac disease [FreeTextEntry3] : Dr. Blunt

## 2023-01-09 NOTE — HISTORY OF PRESENT ILLNESS
[FreeTextEntry1] : \par 57-year-old male with hypertension and hyperlipidemia.  He has History of treatment of a cerebral AV fistula, possible pulmonary embolus, hypertension, hyperlipidemia and a family history for coronary artery disease.\par \par 1/2023 VISIT: few years of ear sensation. pending repeat ENT visit. MRI brain pre and post does not have any issue with prior left dural av fistula coil. is anxious. \par \par 6/2022 VISIT: follow up and to review his Echo. In the interim there have not been any hospitalizations or procedures. He denies chest pain, pressure, palpitations, unusual shortness of breath, ANDRES, orthopnea, LE edema, lightheadedness, dizziness, near syncope or syncope. \par \par He has history of hypertension and labile blood pressure and episodes of  elevated blood pressure with stress.  He is compliant with his medications that include aspirin, Lipitor, Avapro\par

## 2023-01-27 ENCOUNTER — APPOINTMENT (OUTPATIENT)
Dept: CARDIOLOGY | Facility: CLINIC | Age: 58
End: 2023-01-27
Payer: COMMERCIAL

## 2023-01-27 PROCEDURE — 93880 EXTRACRANIAL BILAT STUDY: CPT

## 2023-01-31 ENCOUNTER — NON-APPOINTMENT (OUTPATIENT)
Age: 58
End: 2023-01-31

## 2023-02-03 ENCOUNTER — NON-APPOINTMENT (OUTPATIENT)
Age: 58
End: 2023-02-03

## 2023-02-13 ENCOUNTER — APPOINTMENT (OUTPATIENT)
Dept: CARDIOLOGY | Facility: CLINIC | Age: 58
End: 2023-02-13
Payer: COMMERCIAL

## 2023-02-13 ENCOUNTER — NON-APPOINTMENT (OUTPATIENT)
Age: 58
End: 2023-02-13

## 2023-02-13 VITALS
SYSTOLIC BLOOD PRESSURE: 126 MMHG | WEIGHT: 215 LBS | HEART RATE: 90 BPM | TEMPERATURE: 98 F | DIASTOLIC BLOOD PRESSURE: 82 MMHG | HEIGHT: 70 IN | OXYGEN SATURATION: 97 % | BODY MASS INDEX: 30.78 KG/M2

## 2023-02-13 PROCEDURE — 99215 OFFICE O/P EST HI 40 MIN: CPT | Mod: 25

## 2023-02-13 PROCEDURE — 93000 ELECTROCARDIOGRAM COMPLETE: CPT

## 2023-02-13 RX ORDER — TADALAFIL 5 MG/1
5 TABLET ORAL
Qty: 30 | Refills: 3 | Status: DISCONTINUED | COMMUNITY
Start: 2022-05-03 | End: 2023-02-13

## 2023-02-13 RX ORDER — IRBESARTAN 300 MG/1
300 TABLET ORAL DAILY
Qty: 90 | Refills: 0 | Status: DISCONTINUED | COMMUNITY
Start: 2021-07-06 | End: 2023-02-13

## 2023-02-13 NOTE — DISCUSSION/SUMMARY
[FreeTextEntry1] : \par ## CHEST PAIN SYNDROME: unfortunately he has developed chest pain syndrome over the past 2 weeks occurs intermittently not with exertion. his ekg has deep Q waves in inferior leads with T wave inversions new from 1/9/2023. needs to rule out coronary lesion, told to proceed to [facility] ER. I have told ER and my cath team. He will get worked up in ED and I will plan for angiogram tomorrow pending clinical results. I noted minimal coronary atherosclerosis on cardiac cath in 2019 but clinical status has changed.\par \par # Labile hypertension. Stable today. \par I have asked the patient to keep a blood pressure log.  At one time he was on beta-blocker which was discontinued due to low blood pressures.  He takes Avapro 300 mg daily with hctz 12.5.\par - Advised patient when convenient for him we would place a BP Monitor on him.\par - Echo noted above but does not reveal any HTN changes. \par \par # Hyperlipidemia.  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 appears unremarkable. neurology evaluated. symptoms deemed likely SHO vs anxiety; work up underway. \par Carotid duplex no significant disease. \par \par Follow up after ER. \par

## 2023-02-13 NOTE — REASON FOR VISIT
[Symptom and Test Evaluation] : symptom and test evaluation [CV Risk Factors and Non-Cardiac Disease] : CV risk factors and non-cardiac disease [Follow-Up - Clinic] : a clinic follow-up of [Chest Pain] : chest pain [FreeTextEntry3] : Dr. Blunt

## 2023-02-13 NOTE — HISTORY OF PRESENT ILLNESS
[FreeTextEntry1] : \par 57-year-old male with hypertension and hyperlipidemia. He has History of treatment of a cerebral AV fistula, possible pulmonary embolus, hypertension, hyperlipidemia and a family history for coronary artery disease.\par \par 2/2023 VISIT: unfortunately he has developed chest pain syndrome over the past 2 weeks occurs intermittently not with exertion. his ekg has deep Q waves in inferior leads with T wave inversions new from 1/9/2023.\par \par saw neurology last week and recommended in person sleep evaluation for ?CPAP titration and also lexapro. increased statin to 60 per pcp. bp fairly optimal. \par \par 1/2023 VISIT: few years of ear sensation. pending repeat ENT visit. MRI brain pre and post does not have any issue with prior left dural av fistula coil. is anxious. \par \par 6/2022 VISIT: follow up and to review his Echo. In the interim there have not been any hospitalizations or procedures. He denies chest pain, pressure, palpitations, unusual shortness of breath, ANDRES, orthopnea, LE edema, lightheadedness, dizziness, near syncope or syncope. \par \par He has history of hypertension and labile blood pressure and episodes of  elevated blood pressure with stress.  He is compliant with his medications that include aspirin, Lipitor, Avapro\par

## 2023-02-13 NOTE — CARDIOLOGY SUMMARY
[de-identified] : 6/3/2022: EF 60% Normal LV systolic function and no seg. wall motion abnormalities. Normal PASP.  [de-identified] : 2019: normal Coronaries. minimal coronary atherosclerosis  [de-identified] : 1/2021 no sig carotid disease   [de-identified] : \par 1/2023 LABWORK: LDL 93. normal cbc/cmp. \par 6/2021 LABWORK: ldl 84. cbc.cmp. trig 151.

## 2023-02-27 ENCOUNTER — APPOINTMENT (OUTPATIENT)
Dept: CARDIOLOGY | Facility: CLINIC | Age: 58
End: 2023-02-27
Payer: COMMERCIAL

## 2023-02-27 VITALS
DIASTOLIC BLOOD PRESSURE: 74 MMHG | WEIGHT: 221 LBS | HEART RATE: 97 BPM | HEIGHT: 70 IN | SYSTOLIC BLOOD PRESSURE: 114 MMHG | OXYGEN SATURATION: 97 % | BODY MASS INDEX: 31.64 KG/M2

## 2023-02-27 PROCEDURE — 99214 OFFICE O/P EST MOD 30 MIN: CPT

## 2023-02-27 NOTE — DISCUSSION/SUMMARY
[FreeTextEntry1] : \par # Labile hypertension. controlled today. \par I have asked the patient to keep a blood pressure log.  At one time he was on beta-blocker which was discontinued due to low blood pressures.  He takes Avapro 300 mg daily with hctz 12.5.\par - started amlodipine 5 to treat potential spasm component for chest pain \par - Echo noted above but does not reveal any HTN changes. \par \par # Hyperlipidemia.  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 appears unremarkable. neurology evaluated. symptoms deemed likely SHO vs anxiety; work up underway. \par Carotid duplex no significant disease. \par \par \par Follow up yearly.  ER precautions given to patient.\par \par

## 2023-02-27 NOTE — CARDIOLOGY SUMMARY
[de-identified] : 6/3/2022: EF 60% Normal LV systolic function and no seg. wall motion abnormalities. Normal PASP.  [de-identified] : 2/2023 normal  cath \par 2019: normal Coronaries. minimal coronary atherosclerosis  [de-identified] : 1/2021 no sig carotid disease   [de-identified] : \par 2/2023 LABWORK: LDL 71. normal cbc/cmp. \par 1/2023 LABWORK: LDL 93. normal cbc/cmp. \par 6/2021 LABWORK: ldl 84. cbc.cmp. trig 151.

## 2023-02-27 NOTE — HISTORY OF PRESENT ILLNESS
[FreeTextEntry1] : \par 57-year-old male with hypertension and hyperlipidemia. He has History of treatment of a cerebral AV fistula, possible pulmonary embolus, hypertension, hyperlipidemia and a family history for coronary artery disease.\par \par 2/2023 vISIT: Underwent angiogram due to concern for chest pain syndrome did not have any significant coronary disease and a normal LVEDP was placed on amlodipine consideration of spasm. pain better still randomly there. other symptoms same including tremor. \par \par 2/2023 VISIT: unfortunately he has developed chest pain syndrome over the past 2 weeks occurs intermittently not with exertion. his ekg has deep Q waves in inferior leads with T wave inversions new from 1/9/2023.\par \par saw neurology last week and recommended in person sleep evaluation for ?CPAP titration and also lexapro. increased statin to 60 per pcp. bp fairly optimal. \par \par 1/2023 VISIT: few years of ear sensation. pending repeat ENT visit. MRI brain pre and post does not have any issue with prior left dural av fistula coil. is anxious. \par \par 6/2022 VISIT: follow up and to review his Echo. In the interim there have not been any hospitalizations or procedures. He denies chest pain, pressure, palpitations, unusual shortness of breath, ANDRES, orthopnea, LE edema, lightheadedness, dizziness, near syncope or syncope. \par \par He has history of hypertension and labile blood pressure and episodes of  elevated blood pressure with stress.  He is compliant with his medications that include aspirin, Lipitor, Avapro\par

## 2023-04-17 ENCOUNTER — NON-APPOINTMENT (OUTPATIENT)
Age: 58
End: 2023-04-17

## 2023-04-18 ENCOUNTER — RX ONLY (RX ONLY)
Age: 58
End: 2023-04-18

## 2023-04-18 ENCOUNTER — OFFICE (OUTPATIENT)
Dept: URBAN - METROPOLITAN AREA CLINIC 105 | Facility: CLINIC | Age: 58
Setting detail: OPHTHALMOLOGY
End: 2023-04-18
Payer: COMMERCIAL

## 2023-04-18 DIAGNOSIS — H01.001: ICD-10-CM

## 2023-04-18 DIAGNOSIS — H01.004: ICD-10-CM

## 2023-04-18 DIAGNOSIS — H16.223: ICD-10-CM

## 2023-04-18 PROCEDURE — 99213 OFFICE O/P EST LOW 20 MIN: CPT | Performed by: STUDENT IN AN ORGANIZED HEALTH CARE EDUCATION/TRAINING PROGRAM

## 2023-04-18 ASSESSMENT — REFRACTION_MANIFEST
OD_VA2: 20/20
OS_VA1: 20/20-1
OS_AXIS: 075
OS_SPHERE: -0.25
OD_VA1: 20/20
OD_AXIS: 105
OD_ADD: +2.25
OS_VA2: 20/20
OU_VA: 20/20
OS_ADD: +2.25
OD_CYLINDER: -1.25
OD_SPHERE: -0.75
OS_CYLINDER: -2.25

## 2023-04-18 ASSESSMENT — REFRACTION_AUTOREFRACTION
OS_AXIS: 082
OS_SPHERE: +0.50
OD_CYLINDER: -0.50
OD_AXIS: 098
OD_SPHERE: -0.75
OS_CYLINDER: -3.00

## 2023-04-18 ASSESSMENT — AXIALLENGTH_DERIVED
OS_AL: 24.5309
OS_AL: 24.3739
OD_AL: 24.6305
OD_AL: 24.4723

## 2023-04-18 ASSESSMENT — TONOMETRY
OS_IOP_MMHG: 20
OD_IOP_MMHG: 17

## 2023-04-18 ASSESSMENT — REFRACTION_CURRENTRX
OS_AXIS: 074
OD_CYLINDER: -1.25
OS_OVR_VA: 20/
OS_CYLINDER: -2.50
OD_OVR_VA: 20/
OS_VPRISM_DIRECTION: SV
OS_CYLINDER: -2.25
OS_SPHERE: PLANO
OD_AXIS: 097
OD_VPRISM_DIRECTION: SV
OD_SPHERE: -0.75
OD_AXIS: 098
OD_CYLINDER: -1.25
OD_SPHERE: -0.25
OS_SPHERE: PLANO
OD_OVR_VA: 20/
OS_OVR_VA: 20/
OS_AXIS: 080

## 2023-04-18 ASSESSMENT — SUPERFICIAL PUNCTATE KERATITIS (SPK)
OD_SPK: T
OS_SPK: 1+ 2+

## 2023-04-18 ASSESSMENT — KERATOMETRY
OS_K1POWER_DIOPTERS: 41.75
OS_K2POWER_DIOPTERS: 43.25
OD_AXISANGLE_DEGREES: 090
OS_AXISANGLE_DEGREES: 170
OD_K2POWER_DIOPTERS: 42.25
OD_K1POWER_DIOPTERS: 42.25

## 2023-04-18 ASSESSMENT — LID EXAM ASSESSMENTS
OD_BLEPHARITIS: RUL 1+
OS_BLEPHARITIS: LUL 1+

## 2023-04-18 ASSESSMENT — SPHEQUIV_DERIVED
OS_SPHEQUIV: -1
OS_SPHEQUIV: -1.375
OD_SPHEQUIV: -1
OD_SPHEQUIV: -1.375

## 2023-04-18 ASSESSMENT — VISUAL ACUITY
OS_BCVA: 20/25-1
OD_BCVA: 20/25+2

## 2023-04-26 ENCOUNTER — RESULT REVIEW (OUTPATIENT)
Age: 58
End: 2023-04-26

## 2023-04-26 ENCOUNTER — OUTPATIENT (OUTPATIENT)
Dept: OUTPATIENT SERVICES | Facility: HOSPITAL | Age: 58
LOS: 1 days | End: 2023-04-26

## 2023-04-26 ENCOUNTER — APPOINTMENT (OUTPATIENT)
Dept: NUCLEAR MEDICINE | Facility: CLINIC | Age: 58
End: 2023-04-26
Payer: COMMERCIAL

## 2023-04-26 DIAGNOSIS — Z00.8 ENCOUNTER FOR OTHER GENERAL EXAMINATION: ICD-10-CM

## 2023-04-26 PROCEDURE — 78803 RP LOCLZJ TUM SPECT 1 AREA: CPT | Mod: 26

## 2023-05-19 ENCOUNTER — APPOINTMENT (OUTPATIENT)
Dept: CARDIOLOGY | Facility: CLINIC | Age: 58
End: 2023-05-19
Payer: COMMERCIAL

## 2023-05-19 ENCOUNTER — NON-APPOINTMENT (OUTPATIENT)
Age: 58
End: 2023-05-19

## 2023-05-19 VITALS
OXYGEN SATURATION: 96 % | SYSTOLIC BLOOD PRESSURE: 124 MMHG | DIASTOLIC BLOOD PRESSURE: 72 MMHG | HEART RATE: 91 BPM | HEIGHT: 70 IN | WEIGHT: 218 LBS | BODY MASS INDEX: 31.21 KG/M2

## 2023-05-19 PROCEDURE — 93000 ELECTROCARDIOGRAM COMPLETE: CPT

## 2023-05-19 PROCEDURE — 99214 OFFICE O/P EST MOD 30 MIN: CPT | Mod: 25

## 2023-05-19 RX ORDER — PREDNISOLONE ACETATE 10 MG/ML
1 SUSPENSION/ DROPS OPHTHALMIC
Qty: 10 | Refills: 0 | Status: DISCONTINUED | COMMUNITY
Start: 2022-12-19 | End: 2023-05-19

## 2023-05-19 NOTE — REASON FOR VISIT
[Symptom and Test Evaluation] : symptom and test evaluation [CV Risk Factors and Non-Cardiac Disease] : CV risk factors and non-cardiac disease [Follow-Up - Clinic] : a clinic follow-up of [Chest Pain] : chest pain [FreeTextEntry3] : Dr. Blunt [FreeTextEntry1] : Patient is presenting here today.  He is feeling very anxious.  He had an episode of chest tightness and the neck stiffness.  Symptoms relieved.  He also gets episodes of heartburn sensations.  He had a recent cardiac catheterization which revealed no evidence of any obstructive CAD.  No palpitations.  No shortness of breath or syncope.

## 2023-05-19 NOTE — CARDIOLOGY SUMMARY
[de-identified] : 6/3/2022: EF 60% Normal LV systolic function and no seg. wall motion abnormalities. Normal PASP.  [de-identified] : 2/2023 normal  cath \par 2019: normal Coronaries. minimal coronary atherosclerosis  [de-identified] : 1/2021 no sig carotid disease   [de-identified] : \par 2/2023 LABWORK: LDL 71. normal cbc/cmp. \par 1/2023 LABWORK: LDL 93. normal cbc/cmp. \par 6/2021 LABWORK: ldl 84. cbc.cmp. trig 151.

## 2023-05-19 NOTE — DISCUSSION/SUMMARY
[FreeTextEntry1] : \par # Labile hypertension. controlled today. \par I have asked the patient to keep a blood pressure log.  At one time he was on beta-blocker which was discontinued due to low blood pressures.  He takes Avapro 300 mg daily with hctz 12.5.\par - started amlodipine 5 to treat potential spasm component for chest pain \par - Echo noted above but does not reveal any HTN changes. \par \par # Hyperlipidemia.  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 appears unremarkable. neurology evaluated. symptoms deemed likely SHO vs anxiety; work up underway. \par Carotid duplex no significant disease. \par \par \par Chest tightness and neck stiffness.  Relieved.  He was asked by our nurse to come here for EKG.  ECG was done and reviewed.  Normal sinus rhythm, normal ECG.  Vital signs are stable.  He does a lot of physical work.  Possibly symptoms musculoskeletal versus GI.  Unlikely cardiac.  I asked the patient to follow-up with gastroenterology.  Follow-up with his primary physician.  If he does develop any further significant chest tightness or shortness of breath that does not really within less than 10 minutes he will go to nearest emergency room.  Otherwise continue blood pressure monitoring.  Continue present medications.  Cardiac follow-up 3 months with his cardiologist.\par \par  [EKG obtained to assist in diagnosis and management of assessed problem(s)] : EKG obtained to assist in diagnosis and management of assessed problem(s)

## 2023-05-24 ENCOUNTER — OFFICE (OUTPATIENT)
Dept: URBAN - METROPOLITAN AREA CLINIC 105 | Facility: CLINIC | Age: 58
Setting detail: OPHTHALMOLOGY
End: 2023-05-24
Payer: COMMERCIAL

## 2023-05-24 DIAGNOSIS — H25.13: ICD-10-CM

## 2023-05-24 DIAGNOSIS — H11.153: ICD-10-CM

## 2023-05-24 DIAGNOSIS — H01.001: ICD-10-CM

## 2023-05-24 DIAGNOSIS — H16.223: ICD-10-CM

## 2023-05-24 DIAGNOSIS — H01.004: ICD-10-CM

## 2023-05-24 PROCEDURE — 92014 COMPRE OPH EXAM EST PT 1/>: CPT | Performed by: STUDENT IN AN ORGANIZED HEALTH CARE EDUCATION/TRAINING PROGRAM

## 2023-05-24 ASSESSMENT — REFRACTION_AUTOREFRACTION
OS_SPHERE: +0.50
OS_AXIS: 083
OD_CYLINDER: -1.00
OS_CYLINDER: -3.00
OD_SPHERE: -0.25
OD_AXIS: 097

## 2023-05-24 ASSESSMENT — VISUAL ACUITY
OS_BCVA: 20/40-2
OD_BCVA: 20/80

## 2023-05-24 ASSESSMENT — REFRACTION_CURRENTRX
OS_OVR_VA: 20/
OD_OVR_VA: 20/
OS_AXIS: 074
OS_CYLINDER: -2.50
OS_SPHERE: PLANO
OD_OVR_VA: 20/
OS_VPRISM_DIRECTION: SV
OD_VPRISM_DIRECTION: SV
OS_OVR_VA: 20/
OD_AXIS: 097
OS_AXIS: 080
OD_AXIS: 098
OS_CYLINDER: -2.25
OD_CYLINDER: -1.25
OD_SPHERE: -0.25
OD_SPHERE: -0.75
OD_CYLINDER: -1.25
OS_SPHERE: PLANO

## 2023-05-24 ASSESSMENT — LID EXAM ASSESSMENTS
OD_BLEPHARITIS: RUL 1+
OS_BLEPHARITIS: LUL 1+

## 2023-05-24 ASSESSMENT — REFRACTION_MANIFEST
OD_AXIS: 105
OS_SPHERE: -0.25
OS_CYLINDER: -2.25
OD_VA1: 20/20
OS_VA1: 20/20-1
OD_VA2: 20/20
OU_VA: 20/20
OS_AXIS: 075
OD_SPHERE: -0.75
OD_ADD: +2.25
OS_VA2: 20/20
OS_ADD: +2.25
OD_CYLINDER: -1.25

## 2023-05-24 ASSESSMENT — KERATOMETRY
OS_K2POWER_DIOPTERS: 43.25
OS_K1POWER_DIOPTERS: 41.25
OD_AXISANGLE_DEGREES: 135
OD_K1POWER_DIOPTERS: 42.25
OD_K2POWER_DIOPTERS: 42.50
OS_AXISANGLE_DEGREES: 172

## 2023-05-24 ASSESSMENT — CONFRONTATIONAL VISUAL FIELD TEST (CVF)
OD_FINDINGS: FULL
OS_FINDINGS: FULL

## 2023-05-24 ASSESSMENT — AXIALLENGTH_DERIVED
OD_AL: 24.5806
OD_AL: 24.3191
OS_AL: 24.4723
OS_AL: 24.6305

## 2023-05-24 ASSESSMENT — SUPERFICIAL PUNCTATE KERATITIS (SPK)
OD_SPK: T
OS_SPK: 1+ 2+

## 2023-05-24 ASSESSMENT — SPHEQUIV_DERIVED
OD_SPHEQUIV: -1.375
OD_SPHEQUIV: -0.75
OS_SPHEQUIV: -1.375
OS_SPHEQUIV: -1

## 2023-05-30 ENCOUNTER — APPOINTMENT (OUTPATIENT)
Dept: CARDIOLOGY | Facility: CLINIC | Age: 58
End: 2023-05-30

## 2023-08-11 ENCOUNTER — APPOINTMENT (OUTPATIENT)
Dept: UROLOGY | Facility: CLINIC | Age: 58
End: 2023-08-11
Payer: COMMERCIAL

## 2023-08-11 VITALS
TEMPERATURE: 97.3 F | BODY MASS INDEX: 31.21 KG/M2 | DIASTOLIC BLOOD PRESSURE: 69 MMHG | WEIGHT: 218 LBS | HEART RATE: 90 BPM | HEIGHT: 70 IN | SYSTOLIC BLOOD PRESSURE: 106 MMHG

## 2023-08-11 DIAGNOSIS — N52.9 MALE ERECTILE DYSFUNCTION, UNSPECIFIED: ICD-10-CM

## 2023-08-11 DIAGNOSIS — N45.1 EPIDIDYMITIS: ICD-10-CM

## 2023-08-11 PROCEDURE — 99214 OFFICE O/P EST MOD 30 MIN: CPT

## 2023-08-11 NOTE — HISTORY OF PRESENT ILLNESS
[FreeTextEntry1] : 57 year-old patient presented today for the recurrent left scrotal pain He also preferred to start with the Cialis on demand instead of taking it every day or every other day Otherwise, no other complaints

## 2023-08-11 NOTE — ASSESSMENT
[FreeTextEntry1] : Patient with recurrent left epididymis. I advised him to start treatment with the doxycycline 100 mg twice a day for 21-day I also recommended patient to switch from Cialis 5 mg every day or every other day to Cialis on demand

## 2023-08-11 NOTE — PHYSICAL EXAM
[General Appearance - Well Developed] : well developed [General Appearance - Well Nourished] : well nourished [Normal Appearance] : normal appearance [Abdomen Soft] : soft [Abdomen Tenderness] : non-tender [Urethral Meatus] : meatus normal [Penis Abnormality] : normal uncircumcised penis [FreeTextEntry1] : Pain in the left epididymis [] : no rash [Heart Rate And Rhythm] : Heart rate and rhythm were normal [Arterial Pulses Normal] : the pedal pulses were normal [Edema] : no peripheral edema

## 2023-09-06 ENCOUNTER — APPOINTMENT (OUTPATIENT)
Dept: UROLOGY | Facility: CLINIC | Age: 58
End: 2023-09-06

## 2023-09-08 ENCOUNTER — RX RENEWAL (OUTPATIENT)
Age: 58
End: 2023-09-08

## 2023-11-30 ENCOUNTER — OFFICE (OUTPATIENT)
Dept: URBAN - METROPOLITAN AREA CLINIC 105 | Facility: CLINIC | Age: 58
Setting detail: OPHTHALMOLOGY
End: 2023-11-30
Payer: COMMERCIAL

## 2023-11-30 DIAGNOSIS — H01.004: ICD-10-CM

## 2023-11-30 DIAGNOSIS — H52.7: ICD-10-CM

## 2023-11-30 DIAGNOSIS — H16.223: ICD-10-CM

## 2023-11-30 DIAGNOSIS — H01.001: ICD-10-CM

## 2023-11-30 PROCEDURE — 99213 OFFICE O/P EST LOW 20 MIN: CPT | Performed by: STUDENT IN AN ORGANIZED HEALTH CARE EDUCATION/TRAINING PROGRAM

## 2023-11-30 PROCEDURE — 92015 DETERMINE REFRACTIVE STATE: CPT | Performed by: STUDENT IN AN ORGANIZED HEALTH CARE EDUCATION/TRAINING PROGRAM

## 2023-11-30 ASSESSMENT — REFRACTION_CURRENTRX
OD_AXIS: 098
OS_CYLINDER: -2.25
OS_SPHERE: PLANO
OD_AXIS: 097
OD_VPRISM_DIRECTION: SV
OD_SPHERE: -0.25
OS_AXIS: 080
OS_VPRISM_DIRECTION: SV
OS_AXIS: 074
OD_OVR_VA: 20/
OD_CYLINDER: -1.25
OS_OVR_VA: 20/
OS_OVR_VA: 20/
OD_OVR_VA: 20/
OS_SPHERE: PLANO
OS_CYLINDER: -2.50
OD_SPHERE: -0.75
OD_CYLINDER: -1.25

## 2023-11-30 ASSESSMENT — REFRACTION_MANIFEST
OD_VA2: 20/20
OD_SPHERE: -0.50
OS_ADD: +2.25
OS_CYLINDER: -3.00
OD_ADD: +1.25
OS_SPHERE: PLANO
OD_AXIS: 105
OD_VA1: 20/15
OS_VA2: 20/20
OS_SPHERE: -0.25
OS_VA2: 20/20
OU_VA: 20/20
OD_CYLINDER: -1.00
OS_VA1: 20/15
OD_VA1: 20/20
OS_ADD: +1.25
OD_VA2: 20/20
OD_CYLINDER: -1.25
OD_ADD: +2.25
OD_SPHERE: -0.75
OS_AXIS: 081
OS_CYLINDER: -2.25
OS_AXIS: 075
OD_AXIS: 091
OS_VA1: 20/20-1

## 2023-11-30 ASSESSMENT — SPHEQUIV_DERIVED
OD_SPHEQUIV: -1
OD_SPHEQUIV: -1.375
OS_SPHEQUIV: -1.375
OD_SPHEQUIV: -1
OS_SPHEQUIV: -1.5

## 2023-11-30 ASSESSMENT — CONFRONTATIONAL VISUAL FIELD TEST (CVF)
OD_FINDINGS: FULL
OS_FINDINGS: FULL

## 2023-11-30 ASSESSMENT — REFRACTION_AUTOREFRACTION
OS_AXIS: 081
OD_CYLINDER: -1.00
OD_AXIS: 091
OS_SPHERE: 0.00
OS_CYLINDER: -3.00
OD_SPHERE: -0.50

## 2023-11-30 ASSESSMENT — LID EXAM ASSESSMENTS
OD_BLEPHARITIS: RUL 1+
OS_BLEPHARITIS: LUL 1+

## 2023-11-30 ASSESSMENT — SUPERFICIAL PUNCTATE KERATITIS (SPK)
OD_SPK: T
OS_SPK: 1+ 2+

## 2024-01-15 ENCOUNTER — NON-APPOINTMENT (OUTPATIENT)
Age: 59
End: 2024-01-15

## 2024-01-17 ENCOUNTER — APPOINTMENT (OUTPATIENT)
Dept: CARDIOLOGY | Facility: CLINIC | Age: 59
End: 2024-01-17
Payer: COMMERCIAL

## 2024-01-17 VITALS
OXYGEN SATURATION: 96 % | HEIGHT: 70 IN | HEART RATE: 96 BPM | SYSTOLIC BLOOD PRESSURE: 118 MMHG | WEIGHT: 216 LBS | BODY MASS INDEX: 30.92 KG/M2 | DIASTOLIC BLOOD PRESSURE: 76 MMHG

## 2024-01-17 DIAGNOSIS — I10 ESSENTIAL (PRIMARY) HYPERTENSION: ICD-10-CM

## 2024-01-17 DIAGNOSIS — R07.89 OTHER CHEST PAIN: ICD-10-CM

## 2024-01-17 DIAGNOSIS — E78.5 HYPERLIPIDEMIA, UNSPECIFIED: ICD-10-CM

## 2024-01-17 DIAGNOSIS — Z86.711 PERSONAL HISTORY OF PULMONARY EMBOLISM: ICD-10-CM

## 2024-01-17 PROCEDURE — 99214 OFFICE O/P EST MOD 30 MIN: CPT

## 2024-01-17 RX ORDER — ASPIRIN 81 MG
81 TABLET, DELAYED RELEASE (ENTERIC COATED) ORAL DAILY
Refills: 0 | Status: DISCONTINUED | COMMUNITY
End: 2024-01-17

## 2024-01-17 RX ORDER — MECLIZINE HYDROCHLORIDE 25 MG/1
25 TABLET ORAL
Qty: 21 | Refills: 0 | Status: DISCONTINUED | COMMUNITY
Start: 2022-12-22 | End: 2024-01-17

## 2024-01-17 RX ORDER — AMLODIPINE BESYLATE 5 MG/1
5 TABLET ORAL DAILY
Qty: 90 | Refills: 2 | Status: DISCONTINUED | COMMUNITY
Start: 2023-02-14 | End: 2024-01-17

## 2024-01-17 RX ORDER — TADALAFIL 5 MG/1
5 TABLET ORAL
Qty: 30 | Refills: 0 | Status: DISCONTINUED | COMMUNITY
Start: 2023-08-11 | End: 2024-01-17

## 2024-01-17 RX ORDER — DOXYCYCLINE 100 MG/1
100 CAPSULE ORAL
Qty: 42 | Refills: 0 | Status: DISCONTINUED | COMMUNITY
Start: 2023-08-11 | End: 2024-01-17

## 2024-01-17 RX ORDER — IRBESARTAN AND HYDROCHLOROTHIAZIDE 300; 12.5 MG/1; MG/1
300-12.5 TABLET ORAL DAILY
Refills: 0 | Status: DISCONTINUED | COMMUNITY
End: 2024-01-17

## 2024-01-17 RX ORDER — IRBESARTAN 300 MG/1
300 TABLET ORAL
Qty: 90 | Refills: 2 | Status: ACTIVE | COMMUNITY
Start: 2024-01-17 | End: 1900-01-01

## 2024-01-17 NOTE — REASON FOR VISIT
[Symptom and Test Evaluation] : symptom and test evaluation [CV Risk Factors and Non-Cardiac Disease] : CV risk factors and non-cardiac disease [Follow-Up - Clinic] : a clinic follow-up of [FreeTextEntry3] : Dr. Gen Cuellar

## 2024-01-17 NOTE — DISCUSSION/SUMMARY
[FreeTextEntry1] : # HTN: stop HCTZ component to potentially minimize positional symptoms. maintain irbesartan. Echo noted above but does not reveal any HTN changes.   # Hyperlipidemia.  On Lipitor. stop aspirin.   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 appears unremarkable. neurology evaluated. symptoms deemed likely SHO vs anxiety; work up underway. Carotid duplex no significant disease.   Follow yearly. labwork from pcp. ER precautions given to patient.

## 2024-01-17 NOTE — CARDIOLOGY SUMMARY
[de-identified] : 6/3/2022: EF 60% Normal LV systolic function and no seg. wall motion abnormalities. Normal PASP.  [de-identified] : 2/2023 normal  cath \par  2019: normal Coronaries. minimal coronary atherosclerosis  [de-identified] : 1/2021 no sig carotid disease   [de-identified] : \par  2/2023 LABWORK: LDL 71. normal cbc/cmp. \par  1/2023 LABWORK: LDL 93. normal cbc/cmp. \par  6/2021 LABWORK: ldl 84. cbc.cmp. trig 151.

## 2024-01-17 NOTE — HISTORY OF PRESENT ILLNESS
[FreeTextEntry1] : 58-year-old male with hypertension and hyperlipidemia. He has History of treatment of a cerebral AV fistula, possible pulmonary embolus provoked after surgery 2017, hypertension, hyperlipidemia and a family history for coronary artery disease. He has no cad on angiogram.   1/2024 visit: yearly visit: some dizziness overall benign. i do not see any cv etiology. saw ENT for pulsatile tinnitus then saw NSGY and MRI/MRA looked great. neurology eval benign too.   2/2023 visit: Underwent angiogram due to concern for chest pain syndrome did not have any significant coronary disease and a normal LVEDP was placed on amlodipine consideration of spasm. pain better still randomly there. other symptoms same including tremor.   2/2023 VISIT: unfortunately he has developed chest pain syndrome over the past 2 weeks occurs intermittently not with exertion. his ekg has deep Q waves in inferior leads with T wave inversions new from 1/9/2023.  saw neurology last week and recommended in person sleep evaluation for ?CPAP titration and also lexapro. increased statin to 60 per pcp. bp fairly optimal.   1/2023 VISIT: few years of ear sensation. pending repeat ENT visit. MRI brain pre and post does not have any issue with prior left dural av fistula coil. is anxious.   6/2022 VISIT: follow up and to review his Echo. In the interim there have not been any hospitalizations or procedures. He denies chest pain, pressure, palpitations, unusual shortness of breath, ANDRES, orthopnea, LE edema, lightheadedness, dizziness, near syncope or syncope.

## 2024-01-22 ENCOUNTER — APPOINTMENT (OUTPATIENT)
Dept: CARDIOLOGY | Facility: CLINIC | Age: 59
End: 2024-01-22

## 2024-03-11 ENCOUNTER — APPOINTMENT (OUTPATIENT)
Dept: CARDIOLOGY | Facility: CLINIC | Age: 59
End: 2024-03-11

## 2024-04-13 ENCOUNTER — NON-APPOINTMENT (OUTPATIENT)
Age: 59
End: 2024-04-13

## 2024-04-14 RX ORDER — ATORVASTATIN CALCIUM 40 MG/1
40 TABLET, FILM COATED ORAL
Qty: 135 | Refills: 3 | Status: ACTIVE | COMMUNITY
Start: 2024-04-14 | End: 1900-01-01

## 2024-12-09 NOTE — PHYSICAL EXAM
63.5 [General Appearance - Well Developed] : well developed [General Appearance - Well Nourished] : well nourished [Normal Appearance] : normal appearance [Abdomen Tenderness] : non-tender [Abdomen Soft] : soft [Urethral Meatus] : meatus normal [Penis Abnormality] : normal uncircumcised penis [Prostate Enlargement] : the prostate was not enlarged [Prostate Tenderness] : the prostate was not tender [FreeTextEntry1] : tender lesion in the head of left epididymis [Heart Rate And Rhythm] : Heart rate and rhythm were normal [Arterial Pulses Normal] : the pedal pulses were normal [] : no respiratory distress [Lungs Percussion] : the lungs were normal to percussion

## 2025-01-13 ENCOUNTER — APPOINTMENT (OUTPATIENT)
Dept: CARDIOLOGY | Facility: CLINIC | Age: 60
End: 2025-01-13
Payer: COMMERCIAL

## 2025-01-13 ENCOUNTER — NON-APPOINTMENT (OUTPATIENT)
Age: 60
End: 2025-01-13

## 2025-01-13 VITALS
WEIGHT: 215 LBS | SYSTOLIC BLOOD PRESSURE: 132 MMHG | DIASTOLIC BLOOD PRESSURE: 86 MMHG | HEART RATE: 87 BPM | BODY MASS INDEX: 30.85 KG/M2 | OXYGEN SATURATION: 97 %

## 2025-01-13 DIAGNOSIS — I10 ESSENTIAL (PRIMARY) HYPERTENSION: ICD-10-CM

## 2025-01-13 DIAGNOSIS — Z86.711 PERSONAL HISTORY OF PULMONARY EMBOLISM: ICD-10-CM

## 2025-01-13 DIAGNOSIS — E78.5 HYPERLIPIDEMIA, UNSPECIFIED: ICD-10-CM

## 2025-01-13 PROCEDURE — 99214 OFFICE O/P EST MOD 30 MIN: CPT

## 2025-01-13 PROCEDURE — 93000 ELECTROCARDIOGRAM COMPLETE: CPT

## 2025-01-13 RX ORDER — IRBESARTAN AND HYDROCHLOROTHIAZIDE 300; 12.5 MG/1; MG/1
300-12.5 TABLET ORAL DAILY
Qty: 90 | Refills: 2 | Status: ACTIVE | COMMUNITY
Start: 2025-01-13 | End: 1900-01-01

## 2025-01-21 ENCOUNTER — OUTPATIENT (OUTPATIENT)
Dept: OUTPATIENT SERVICES | Facility: HOSPITAL | Age: 60
LOS: 1 days | End: 2025-01-21
Payer: COMMERCIAL

## 2025-01-21 DIAGNOSIS — G47.33 OBSTRUCTIVE SLEEP APNEA (ADULT) (PEDIATRIC): ICD-10-CM

## 2025-01-21 PROCEDURE — 95800 SLP STDY UNATTENDED: CPT

## 2025-01-21 PROCEDURE — 95800 SLP STDY UNATTENDED: CPT | Mod: 26

## 2025-01-23 ENCOUNTER — NON-APPOINTMENT (OUTPATIENT)
Age: 60
End: 2025-01-23

## 2025-03-10 ENCOUNTER — APPOINTMENT (OUTPATIENT)
Dept: CARDIOLOGY | Facility: CLINIC | Age: 60
End: 2025-03-10
Payer: COMMERCIAL

## 2025-03-10 VITALS
SYSTOLIC BLOOD PRESSURE: 120 MMHG | OXYGEN SATURATION: 96 % | WEIGHT: 213 LBS | BODY MASS INDEX: 30.56 KG/M2 | DIASTOLIC BLOOD PRESSURE: 76 MMHG | HEART RATE: 90 BPM

## 2025-03-10 DIAGNOSIS — E78.5 HYPERLIPIDEMIA, UNSPECIFIED: ICD-10-CM

## 2025-03-10 DIAGNOSIS — I10 ESSENTIAL (PRIMARY) HYPERTENSION: ICD-10-CM

## 2025-03-10 PROCEDURE — 99214 OFFICE O/P EST MOD 30 MIN: CPT

## 2025-09-10 ENCOUNTER — APPOINTMENT (OUTPATIENT)
Dept: CARDIOLOGY | Facility: CLINIC | Age: 60
End: 2025-09-10
Payer: COMMERCIAL

## 2025-09-10 PROCEDURE — 93306 TTE W/DOPPLER COMPLETE: CPT

## 2025-09-19 ENCOUNTER — NON-APPOINTMENT (OUTPATIENT)
Age: 60
End: 2025-09-19

## 2025-09-19 ENCOUNTER — APPOINTMENT (OUTPATIENT)
Dept: CARDIOLOGY | Facility: CLINIC | Age: 60
End: 2025-09-19
Payer: COMMERCIAL

## 2025-09-19 VITALS
HEIGHT: 70 IN | WEIGHT: 213 LBS | DIASTOLIC BLOOD PRESSURE: 64 MMHG | HEART RATE: 100 BPM | OXYGEN SATURATION: 97 % | BODY MASS INDEX: 30.49 KG/M2 | SYSTOLIC BLOOD PRESSURE: 120 MMHG

## 2025-09-19 DIAGNOSIS — E78.5 HYPERLIPIDEMIA, UNSPECIFIED: ICD-10-CM

## 2025-09-19 DIAGNOSIS — I10 ESSENTIAL (PRIMARY) HYPERTENSION: ICD-10-CM

## 2025-09-19 PROCEDURE — 99214 OFFICE O/P EST MOD 30 MIN: CPT
